# Patient Record
Sex: FEMALE | Race: ASIAN | NOT HISPANIC OR LATINO | Employment: FULL TIME | URBAN - METROPOLITAN AREA
[De-identification: names, ages, dates, MRNs, and addresses within clinical notes are randomized per-mention and may not be internally consistent; named-entity substitution may affect disease eponyms.]

---

## 2017-03-31 ENCOUNTER — APPOINTMENT (OUTPATIENT)
Dept: LAB | Facility: CLINIC | Age: 42
End: 2017-03-31
Payer: COMMERCIAL

## 2017-03-31 ENCOUNTER — TRANSCRIBE ORDERS (OUTPATIENT)
Dept: LAB | Facility: CLINIC | Age: 42
End: 2017-03-31

## 2017-03-31 DIAGNOSIS — M25.552 PAIN IN LEFT HIP: ICD-10-CM

## 2017-03-31 DIAGNOSIS — R79.89 OTHER SPECIFIED ABNORMAL FINDINGS OF BLOOD CHEMISTRY: ICD-10-CM

## 2017-03-31 DIAGNOSIS — R10.13 EPIGASTRIC PAIN: ICD-10-CM

## 2017-03-31 DIAGNOSIS — J30.9 ALLERGIC RHINITIS: ICD-10-CM

## 2017-03-31 DIAGNOSIS — R73.09 OTHER ABNORMAL GLUCOSE: ICD-10-CM

## 2017-03-31 DIAGNOSIS — L70.9 ACNE: ICD-10-CM

## 2017-03-31 DIAGNOSIS — Z00.00 ENCOUNTER FOR GENERAL ADULT MEDICAL EXAMINATION WITHOUT ABNORMAL FINDINGS: ICD-10-CM

## 2017-03-31 DIAGNOSIS — E10.9 TYPE 1 DIABETES MELLITUS WITHOUT COMPLICATIONS (HCC): ICD-10-CM

## 2017-03-31 DIAGNOSIS — N64.4 MASTODYNIA: ICD-10-CM

## 2017-03-31 LAB
ALBUMIN SERPL BCP-MCNC: 3.2 G/DL (ref 3.5–5)
ALP SERPL-CCNC: 111 U/L (ref 46–116)
ALT SERPL W P-5'-P-CCNC: 34 U/L (ref 12–78)
ANION GAP SERPL CALCULATED.3IONS-SCNC: 7 MMOL/L (ref 4–13)
AST SERPL W P-5'-P-CCNC: 17 U/L (ref 5–45)
BILIRUB SERPL-MCNC: 0.4 MG/DL (ref 0.2–1)
BUN SERPL-MCNC: 10 MG/DL (ref 5–25)
CALCIUM SERPL-MCNC: 8.6 MG/DL (ref 8.3–10.1)
CHLORIDE SERPL-SCNC: 106 MMOL/L (ref 100–108)
CHOLEST SERPL-MCNC: 195 MG/DL (ref 50–200)
CO2 SERPL-SCNC: 29 MMOL/L (ref 21–32)
CREAT SERPL-MCNC: 0.77 MG/DL (ref 0.6–1.3)
CREAT UR-MCNC: 221 MG/DL
ERYTHROCYTE [DISTWIDTH] IN BLOOD BY AUTOMATED COUNT: 13.1 % (ref 11.6–15.1)
EST. AVERAGE GLUCOSE BLD GHB EST-MCNC: 143 MG/DL
GFR SERPL CREATININE-BSD FRML MDRD: >60 ML/MIN/1.73SQ M
GLUCOSE P FAST SERPL-MCNC: 119 MG/DL (ref 65–99)
HBA1C MFR BLD: 6.6 % (ref 4.2–6.3)
HCT VFR BLD AUTO: 37.9 % (ref 34.8–46.1)
HDLC SERPL-MCNC: 54 MG/DL (ref 40–60)
HGB BLD-MCNC: 11.8 G/DL (ref 11.5–15.4)
LDLC SERPL CALC-MCNC: 117 MG/DL (ref 0–100)
MCH RBC QN AUTO: 26.5 PG (ref 26.8–34.3)
MCHC RBC AUTO-ENTMCNC: 31.1 G/DL (ref 31.4–37.4)
MCV RBC AUTO: 85 FL (ref 82–98)
MICROALBUMIN UR-MCNC: 11.4 MG/L (ref 0–20)
MICROALBUMIN/CREAT 24H UR: 5 MG/G CREATININE (ref 0–30)
PLATELET # BLD AUTO: 234 THOUSANDS/UL (ref 149–390)
PMV BLD AUTO: 10 FL (ref 8.9–12.7)
POTASSIUM SERPL-SCNC: 4 MMOL/L (ref 3.5–5.3)
PROT SERPL-MCNC: 6.8 G/DL (ref 6.4–8.2)
RBC # BLD AUTO: 4.45 MILLION/UL (ref 3.81–5.12)
SODIUM SERPL-SCNC: 142 MMOL/L (ref 136–145)
TRIGL SERPL-MCNC: 119 MG/DL
TSH SERPL DL<=0.05 MIU/L-ACNC: 3.05 UIU/ML (ref 0.36–3.74)
WBC # BLD AUTO: 8.6 THOUSAND/UL (ref 4.31–10.16)

## 2017-03-31 PROCEDURE — 82570 ASSAY OF URINE CREATININE: CPT

## 2017-03-31 PROCEDURE — 80061 LIPID PANEL: CPT

## 2017-03-31 PROCEDURE — 80053 COMPREHEN METABOLIC PANEL: CPT

## 2017-03-31 PROCEDURE — 84443 ASSAY THYROID STIM HORMONE: CPT

## 2017-03-31 PROCEDURE — 82043 UR ALBUMIN QUANTITATIVE: CPT

## 2017-03-31 PROCEDURE — 36415 COLL VENOUS BLD VENIPUNCTURE: CPT

## 2017-03-31 PROCEDURE — 83036 HEMOGLOBIN GLYCOSYLATED A1C: CPT

## 2017-03-31 PROCEDURE — 85027 COMPLETE CBC AUTOMATED: CPT

## 2017-04-05 ENCOUNTER — ALLSCRIPTS OFFICE VISIT (OUTPATIENT)
Dept: OTHER | Facility: OTHER | Age: 42
End: 2017-04-05

## 2017-07-17 DIAGNOSIS — E10.9 TYPE 1 DIABETES MELLITUS WITHOUT COMPLICATIONS (HCC): ICD-10-CM

## 2017-08-29 ENCOUNTER — APPOINTMENT (OUTPATIENT)
Dept: LAB | Facility: CLINIC | Age: 42
End: 2017-08-29
Payer: COMMERCIAL

## 2017-08-29 ENCOUNTER — GENERIC CONVERSION - ENCOUNTER (OUTPATIENT)
Dept: OTHER | Facility: OTHER | Age: 42
End: 2017-08-29

## 2017-08-29 ENCOUNTER — TRANSCRIBE ORDERS (OUTPATIENT)
Dept: LAB | Facility: CLINIC | Age: 42
End: 2017-08-29

## 2017-08-29 DIAGNOSIS — E10.9 TYPE 1 DIABETES MELLITUS WITHOUT COMPLICATIONS (HCC): ICD-10-CM

## 2017-08-29 LAB
ALBUMIN SERPL BCP-MCNC: 3.2 G/DL (ref 3.5–5)
ALP SERPL-CCNC: 103 U/L (ref 46–116)
ALT SERPL W P-5'-P-CCNC: 24 U/L (ref 12–78)
ANION GAP SERPL CALCULATED.3IONS-SCNC: 8 MMOL/L (ref 4–13)
AST SERPL W P-5'-P-CCNC: 12 U/L (ref 5–45)
BILIRUB SERPL-MCNC: 0.5 MG/DL (ref 0.2–1)
BUN SERPL-MCNC: 9 MG/DL (ref 5–25)
CALCIUM SERPL-MCNC: 8.7 MG/DL (ref 8.3–10.1)
CHLORIDE SERPL-SCNC: 105 MMOL/L (ref 100–108)
CHOLEST SERPL-MCNC: 174 MG/DL (ref 50–200)
CO2 SERPL-SCNC: 27 MMOL/L (ref 21–32)
CREAT SERPL-MCNC: 0.65 MG/DL (ref 0.6–1.3)
ERYTHROCYTE [DISTWIDTH] IN BLOOD BY AUTOMATED COUNT: 13.3 % (ref 11.6–15.1)
EST. AVERAGE GLUCOSE BLD GHB EST-MCNC: 143 MG/DL
GFR SERPL CREATININE-BSD FRML MDRD: 108 ML/MIN/1.73SQ M
GLUCOSE P FAST SERPL-MCNC: 120 MG/DL (ref 65–99)
HBA1C MFR BLD: 6.6 % (ref 4.2–6.3)
HCT VFR BLD AUTO: 38.7 % (ref 34.8–46.1)
HDLC SERPL-MCNC: 46 MG/DL (ref 40–60)
HGB BLD-MCNC: 12.3 G/DL (ref 11.5–15.4)
LDLC SERPL CALC-MCNC: 96 MG/DL (ref 0–100)
MCH RBC QN AUTO: 26.3 PG (ref 26.8–34.3)
MCHC RBC AUTO-ENTMCNC: 31.8 G/DL (ref 31.4–37.4)
MCV RBC AUTO: 83 FL (ref 82–98)
PLATELET # BLD AUTO: 270 THOUSANDS/UL (ref 149–390)
PMV BLD AUTO: 9.5 FL (ref 8.9–12.7)
POTASSIUM SERPL-SCNC: 4.2 MMOL/L (ref 3.5–5.3)
PROT SERPL-MCNC: 7.1 G/DL (ref 6.4–8.2)
RBC # BLD AUTO: 4.68 MILLION/UL (ref 3.81–5.12)
SODIUM SERPL-SCNC: 140 MMOL/L (ref 136–145)
TRIGL SERPL-MCNC: 160 MG/DL
TSH SERPL DL<=0.05 MIU/L-ACNC: 1.42 UIU/ML (ref 0.36–3.74)
WBC # BLD AUTO: 7.56 THOUSAND/UL (ref 4.31–10.16)

## 2017-08-29 PROCEDURE — 36415 COLL VENOUS BLD VENIPUNCTURE: CPT

## 2017-08-29 PROCEDURE — 80061 LIPID PANEL: CPT

## 2017-08-29 PROCEDURE — 80053 COMPREHEN METABOLIC PANEL: CPT

## 2017-08-29 PROCEDURE — 83036 HEMOGLOBIN GLYCOSYLATED A1C: CPT

## 2017-08-29 PROCEDURE — 84443 ASSAY THYROID STIM HORMONE: CPT

## 2017-08-29 PROCEDURE — 85027 COMPLETE CBC AUTOMATED: CPT

## 2017-08-30 ENCOUNTER — ALLSCRIPTS OFFICE VISIT (OUTPATIENT)
Dept: OTHER | Facility: OTHER | Age: 42
End: 2017-08-30

## 2017-09-07 ENCOUNTER — ALLSCRIPTS OFFICE VISIT (OUTPATIENT)
Dept: OTHER | Facility: OTHER | Age: 42
End: 2017-09-07

## 2017-09-07 DIAGNOSIS — M54.2 CERVICALGIA: ICD-10-CM

## 2017-09-07 DIAGNOSIS — M25.562 PAIN IN LEFT KNEE: ICD-10-CM

## 2017-10-03 ENCOUNTER — APPOINTMENT (OUTPATIENT)
Dept: PHYSICAL THERAPY | Facility: CLINIC | Age: 42
End: 2017-10-03
Payer: COMMERCIAL

## 2017-10-03 ENCOUNTER — GENERIC CONVERSION - ENCOUNTER (OUTPATIENT)
Dept: OTHER | Facility: OTHER | Age: 42
End: 2017-10-03

## 2017-10-03 DIAGNOSIS — M25.562 PAIN IN LEFT KNEE: ICD-10-CM

## 2017-10-03 DIAGNOSIS — M54.2 CERVICALGIA: ICD-10-CM

## 2017-10-03 PROCEDURE — G8984 CARRY CURRENT STATUS: HCPCS

## 2017-10-03 PROCEDURE — 97162 PT EVAL MOD COMPLEX 30 MIN: CPT

## 2017-10-03 PROCEDURE — 97110 THERAPEUTIC EXERCISES: CPT

## 2017-10-03 PROCEDURE — G8985 CARRY GOAL STATUS: HCPCS

## 2017-10-11 ENCOUNTER — APPOINTMENT (OUTPATIENT)
Dept: PHYSICAL THERAPY | Facility: CLINIC | Age: 42
End: 2017-10-11
Payer: COMMERCIAL

## 2017-10-11 PROCEDURE — 97140 MANUAL THERAPY 1/> REGIONS: CPT

## 2017-10-11 PROCEDURE — 97112 NEUROMUSCULAR REEDUCATION: CPT

## 2017-10-13 ENCOUNTER — APPOINTMENT (OUTPATIENT)
Dept: PHYSICAL THERAPY | Facility: CLINIC | Age: 42
End: 2017-10-13
Payer: COMMERCIAL

## 2017-10-13 PROCEDURE — 97110 THERAPEUTIC EXERCISES: CPT

## 2017-10-13 PROCEDURE — 97112 NEUROMUSCULAR REEDUCATION: CPT

## 2017-10-17 ENCOUNTER — APPOINTMENT (OUTPATIENT)
Dept: PHYSICAL THERAPY | Facility: CLINIC | Age: 42
End: 2017-10-17
Payer: COMMERCIAL

## 2017-10-17 PROCEDURE — 97112 NEUROMUSCULAR REEDUCATION: CPT

## 2017-10-17 PROCEDURE — 97110 THERAPEUTIC EXERCISES: CPT

## 2017-10-18 ENCOUNTER — GENERIC CONVERSION - ENCOUNTER (OUTPATIENT)
Dept: OTHER | Facility: OTHER | Age: 42
End: 2017-10-18

## 2017-10-18 LAB
LEFT EYE DIABETIC RETINOPATHY: NORMAL
RIGHT EYE DIABETIC RETINOPATHY: NORMAL

## 2017-10-20 ENCOUNTER — APPOINTMENT (OUTPATIENT)
Dept: PHYSICAL THERAPY | Facility: CLINIC | Age: 42
End: 2017-10-20
Payer: COMMERCIAL

## 2017-10-20 PROCEDURE — 97110 THERAPEUTIC EXERCISES: CPT

## 2017-10-20 PROCEDURE — 97010 HOT OR COLD PACKS THERAPY: CPT | Performed by: PHYSICAL THERAPIST

## 2017-10-25 ENCOUNTER — APPOINTMENT (OUTPATIENT)
Dept: PHYSICAL THERAPY | Facility: CLINIC | Age: 42
End: 2017-10-25
Payer: COMMERCIAL

## 2017-10-25 PROCEDURE — 97110 THERAPEUTIC EXERCISES: CPT

## 2017-10-25 NOTE — PROGRESS NOTES
Assessment  1  Muscle spasm of back (724 8) (M62 830)    Plan  Muscle spasm of back    · Cyclobenzaprine HCl - 5 MG Oral Tablet; TAKE 1 TABLET AT BEDTIME    Discussion/Summary    Pt treated with muscle relaxant which she was advised to take on a when necessary basis  She was also advised warm compresses and gentle range of motion  Patient advised to avoid carrying heavy weight on the right side  Follow-up as needed Advised  Possible side effects of new medications were reviewed with the patient/guardian today  The treatment plan was reviewed with the patient/guardian  The patient/guardian understands and agrees with the treatment plan      Chief Complaint  Right breast pain and blisters      History of Present Illness  HPI: Patient is here reporting symptoms of upper back pain mainly on her right side  According to patient, her symptoms have been going on for the past 3 months, gradually worsening over time  pain starts in her upper back and radiates to the front of her chest wall, sometimes to the nipple  Patient describes it as a sharp shooting kind of pain  Muscle which usually helps relieve her symptoms  has also been experiencing increased itching of the area  states that she has been traveling recently and has been carrying luggage and backpack on her right shoulder, which could be exacerbating her symptoms, since she has had issues with her right shoulder in the past    Back Pain (Brief): The patient is being seen for an initial evaluation of back pain  Symptoms:  back stiffness, but-- no lower extremity numbness,-- no lower extremity tingling-- and-- no lower extremity weakness--    The patient presents with complaints of gradual onset of intermittent episodes of moderate right upper back pain, described as aching  The symptoms resulted from a lifting motion  Episodes started 3 months ago  Symptoms are made worse by lifting  Symptoms are unchanged  The patient is currently experiencing symptoms   No associated symptoms are reported  Review of Systems    Constitutional: as noted in HPI  Cardiovascular: no complaints of slow or fast heart rate, no chest pain, no palpitations, no leg claudication or lower extremity edema  Respiratory: no complaints of shortness of breath, no wheezing, no dyspnea on exertion, no orthopnea or PND  Musculoskeletal: as noted in HPI  Active Problems  1  Abdominal pain (789 00) (R10 9)   2  Abdominal pain, epigastric (789 06) (R10 13)   3  Abnormal glucose (790 29) (R73 09)   4  Abnormal liver function test (790 6) (R79 89)   5  Acne (706 1) (L70 9)   6  Allergic rhinitis (477 9) (J30 9)   7  Annual physical exam (V70 0) (Z00 00)   8  Breast pain, left (611 71) (N64 4)   9  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)   10  Chronic left hip pain (719 45,338 29) (M25 552,G89 29)   11  Chronic lower back pain (724 2,338 29) (M54 5,G89 29)   12  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)   13  Diabetes mellitus out of control (250 02) (E11 65)   14  Disease of nail (703 9) (L60 9)   15  Elevated serum alkaline phosphatase level (790 5) (R74 8)   16  Encounter for routine gynecological examination (V72 31) (Z01 419)   17  Encounter for screening mammogram for breast cancer (V76 12) (Z12 31)   18  Encounter for screening mammogram for malignant neoplasm of breast (V76 12)    (Z12 31)   19  Folliculitis (245 4) (T96 7)   20  Gastritis (535 50) (K29 70)   21  GERD without esophagitis (530 81) (K21 9)   22  Hand pain, unspecified laterality   23  Hyperlipidemia (272 4) (E78 5)   24  Impaired fasting glucose (790 21) (R73 01)   25  Influenza vaccine needed (V04 81) (Z23)   26  Ingrown nail of great toe of right foot (703 0) (L60 0)   27  Intervertebral disc disorders with radiculopathy, lumbar region (724 4) (M51 16)   28  Knee effusion, left (719 06) (M25 462)   29  Left knee pain (719 46) (M25 562)   30  Left lumbar radiculopathy (724 4) (M54 16)   31   Lower abdominal pain (789 09) (R10 30)   32  Mastitis (611 0) (N61 0)   33  Neck muscle strain (847 0) (S16 1XXA)   34  Need for pneumococcal vaccination (V03 82) (Z23)   35  Need for prophylactic vaccination and inoculation against influenza (V04 81) (Z23)   36  Obesity (278 00) (E66 9)   37  Patellofemoral syndrome (719 46) (M22 2X9)   38  Screening for diabetic retinopathy (V80 2) (Z13 5)   39  Strain of quadriceps muscle, fascia, or tendon (843 8) (S76 119A)   40  Tachypnea (786 06) (R06 82)   41  Type 2 diabetes mellitus (250 00) (E11 9)   42  Vaginal candidiasis (112 1) (B37 3)   43  Visit for screening mammogram (V76 12) (Z12 31)   44  Wart viral (078 10) (B07 9)    Past Medical History  1  History of Acute bronchiolitis (466 19) (J21 9)   2  History of Acute upper respiratory infection (465 9) (J06 9)   3  History of Cough (786 2) (R05)   4  History of Dysuria (788 1) (R30 0)   5  History of Elevated serum alkaline phosphatase level (790 5) (R74 8)   6  History of Encounter for removal of sutures (V58 32) (Z48 02)   7  History of Endometriosis (617 9) (N80 9)   8  History of acute bronchitis (V12 69) (Z87 09)   9  History of acute sinusitis (V12 69) (Z87 09)   10  History of allergic rhinitis (V12 69) (Z87 09)   11  History of atopic dermatitis (V13 3) (Z87 2)   12  History of headache (V13 89) (Z87 898)   13  History of heartburn (V12 79) (Z87 898)   14  History of pregnancy (V13 29)   15  History of vertigo (V12 49) (Z87 898)   16  History of viral warts (V12 09) (Z86 19)   17  History of Influenza vaccine needed (V04 81) (Z23)   18  History of Menarche (V21 8)   19  History of Noninfectious Dermatological Conditions (709 9)   20  History of Plantar fascial fibromatosis (728 71) (M72 2)   21  History of Visit for screening mammogram (V76 12) (Z12 31)   22  History of Well adult on routine health check (V70 0) (Z00 00)  Active Problems And Past Medical History Reviewed:    The active problems and past medical history were reviewed and updated today  Family History  Mother    1  Family history of Type 2 Diabetes Mellitus  Father    2  Family history of Hyperlipidemia  Son    3  Family history of Overall Condition: Good    Social History   · Denied: History of Alcohol Use (History)   · Caffeine Use   · Marital History - Currently    · Never A Smoker  The social history was reviewed and updated today  Surgical History  1  History of Appendectomy   2  History of Total Abdominal Hysterectomy    Current Meds   1  Accu-Chek Active STRP; USE 1 STRIP Twice daily; Therapy: 15CIL6511 to (Last Rx:48Bha8081)  Requested for: 70CRT0698 Ordered   2  Atorvastatin Calcium 10 MG Oral Tablet; take 1 tablet by mouth every evening; Therapy: 01SZT9217 to (Evaluate:04Oct2017)  Requested for: 07Apr2017; Last   Rx:42Zhe8924 Ordered   3  Fluticasone Propionate 50 MCG/ACT Nasal Suspension; use 2 sprays in each nostril   once daily; Therapy: 73NSJ7588 to ([phone])  Requested for: 07Apr2017; Last   Rx:79Ejv1493 Ordered   4  Janumet  MG Oral Tablet; Take 1 tablet  daily; Therapy: 12WDO2068 to (Evaluate:04Oct2017)  Requested for: 07Apr2017; Last   Rx:11Cgy4551 Ordered   5  Montelukast Sodium 10 MG Oral Tablet; TAKE 1 TABLET BY MOUTH EVERY DAY IN   THE MORNING; Therapy: 85OSE3473 to ([phone])  Requested for: 07Apr2017; Last   Rx:11Agc3550 Ordered   6  Omeprazole 20 MG Oral Capsule Delayed Release; TAKE 1 CAPSULE DAILY; Therapy: 47Mbp2199 to (Evaluate:04Oct2017)  Requested for: 07Apr2017; Last   Rx:07Apr2017 Ordered   7  OneTouch Lancets Miscellaneous; check blood sugar 1-2 times daily , Dx 250; Therapy: 62EAM6448 to (Last Rx:85Qky7173)  Requested for: 74KBL4532 Ordered    The medication list was reviewed and updated today  Allergies  1  Tylenol TABS   2   Tylenol with Codeine #3 TABS    Vitals   Recorded: 36Osx6141 10:58AM Recorded: 17Ivr3053 10:54AM   Temperature 98 5 F    Heart Rate  72   Respiration  14   Systolic 240   Diastolic  72   Height  6 ft 7 in   Weight  190 lb    BMI Calculated  21 4   BSA Calculated  2 23     Physical Exam    Constitutional   General appearance: No acute distress, well appearing and well nourished  Pulmonary   Auscultation of lungs: Clear to auscultation  Cardiovascular   Auscultation of heart: Normal rate and rhythm, normal S1 and S2, without murmurs  Examination of extremities for edema and/or varicosities: Normal     Musculoskeletal   Inspection/palpation of joints, bones, and muscles: Normal     Skin   Skin and subcutaneous tissue: Normal without rashes or lesions  Neurologic   Cranial nerves: Cranial nerves 2-12 intact  Reflexes: 2+ and symmetric  Psychiatric   Orientation to person, place, and time: Normal     Mood and affect: Normal     Additional Exam:  Breast- no abnormal swelling felt  Future Appointments    Date/Time Provider Specialty Site   09/07/2017 02:00 PM HIRA Lucero   Family [facility]     Signatures   Electronically signed by : Carolina Adams MD; Aug 30 2017 12:36PM EST                       (Author)

## 2017-10-26 NOTE — PROGRESS NOTES
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)   2  Left knee pain (719 46) (M25 562)   3  Chronic neck pain (723 1,338 29) (M54 2,G89 29)   4  Need for influenza vaccination (V04 81) (Z23)   5  Shingles rash (053 9) (B02 9)   · on rt side breast and chest,, healing    Plan  Brittle diabetes mellitus    · (1) CBC/ PLT (NO DIFF); Status:Active; Requested for:07Dec2017;    · (1) COMPREHENSIVE METABOLIC PANEL; Status:Active; Requested for:07Dec2017;    · (1) HEMOGLOBIN A1C; Status:Active; Requested for:07Dec2017;    · (1) LIPID PANEL, FASTING; Status:Active; Requested for:07Dec2017;    · Follow-up visit in 3 months Evaluation and Treatment  Follow-up  Status: Hold For -  Scheduling  Requested for: 07Sep2017   · Begin a limited exercise program ; Status:Complete;   Done: 02AHR8289   · Inspect your feet and legs daily if you have vascular disease ; Status:Complete;   Done:  83LPK7504   · Restrict the salt in your diet by avoiding highly salted foods ; Status:Complete;   Done:  80PCS4188   · Some eating tips that can help you lose weight ; Status:Complete;   Done: 77HEW0646   · Start eating more fiber ; Status:Complete;   Done: 25CAQ6594   · There are many exercise options for seniors ; Status:Complete;   Done: 25GUR0982  Chronic neck pain    · *VB - Foot Exam; Status:Active; Requested OGA:12DGW7161;   Chronic neck pain, Left knee pain    · *1 - [facility]  *  Status: Active  Requested for: 07Sep2017  Care Summary provided  : Yes  GERD without esophagitis    · Omeprazole 20 MG Oral Capsule Delayed Release; TAKE 1 CAPSULE DAILY  Hyperlipidemia    · Atorvastatin Calcium 10 MG Oral Tablet; take 1 tablet by mouth every evening  Need for influenza vaccination    · Fluzone Quadrivalent 0 5 ML Intramuscular Suspension Prefilled Syringe;  INJECT 0 5  ML Intramuscular;  To Be Done: 07Sep2017  PMH: History of allergic rhinitis, Type 2 diabetes mellitus,     · Montelukast Sodium 10 MG Oral Tablet (Singulair); TAKE 1 TABLET BY MOUTH  EVERY DAY IN THE MORNING  Type 2 diabetes mellitus    · Janumet  MG Oral Tablet; Take 1 tablet  daily    Discussion/Summary    Diabetes is well control A1c is 6 6 which is stable, continue same medication  low carb diet  stable LDL improved, its 96 now,has a shingles rash on the right side of the chest wall and it is already dried up, is itchy so she can use hydrocortisone cream or Benadryl cream,will go mammogram after this rash completely resolves,has chronic on and off neck pain and bilateral, she does lot of traveling and lifting weights and also she has left knee pain which only gets worse when she holds this leg over the other leg, she has previous history of this knee effusion and a steroid shot was given by orthopedic,  Advised to get physical therapy for the left knee and the neck,  And if symptoms do not improve needs to see a orthopedic, she has previous MRI of her back with some disc bulging, and there is no back pain now,  The patient was counseled regarding diagnostic results,-- instructions for management,-- prognosis,-- impressions,-- risks and benefits of treatment options,-- importance of compliance with treatment  Possible side effects of new medications were reviewed with the patient/guardian today  The treatment plan was reviewed with the patient/guardian  The patient/guardian understands and agrees with the treatment plan      Chief Complaint  FOLLOW UP TO REVIEW LABS      History of Present Illness  NECK PAIN FOR 1 YEAR on and off she feels discomfort she denies any injury to the neck, she does lot of travel to [facility] and lifts heavy backpacks, us or shooting pain to the arms  KNEE PAIN FOR 1 YEAR, HAD CORtisone injection in this knee last year and was seen by orthopedic she also had some effusion at that time in the knee she has no problem in walking but only feels pain when she put this leg on the other leg ,complains of a rash and itching on the right side of her chest wall, which started a few weeks ago that is no vesicles but sometimes she touches the area, she feels discomfort,  she had chickenpox as a babyhas been going through a lot of stress traveling a lot,is diabetic and she has been following her diet with low carb and she has hyperlipidemia and she takes her both medications regularly  Review of Systems    ROS reviewed  Active Problems  1  Abdominal pain (789 00) (R10 9)   2  Abdominal pain, epigastric (789 06) (R10 13)   3  Abnormal glucose (790 29) (R73 09)   4  Abnormal liver function test (790 6) (R79 89)   5  Acne (706 1) (L70 9)   6  Allergic rhinitis (477 9) (J30 9)   7  Annual physical exam (V70 0) (Z00 00)   8  Breast pain, left (611 71) (N64 4)   9  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)   10  Chronic left hip pain (719 45,338 29) (M25 552,G89 29)   11  Chronic lower back pain (724 2,338 29) (M54 5,G89 29)   12  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)   13  Disease of nail (703 9) (L60 9)   14  Elevated serum alkaline phosphatase level (790 5) (R74 8)   15  Encounter for routine gynecological examination (V72 31) (Z01 419)   16  Encounter for screening mammogram for breast cancer (V76 12) (Z12 31)   17  Encounter for screening mammogram for malignant neoplasm of breast (V76 12)    (Z12 31)   18  Folliculitis (659 0) (Q20 8)   19  Gastritis (535 50) (K29 70)   20  GERD without esophagitis (530 81) (K21 9)   21  Hand pain, unspecified laterality   22  Hyperlipidemia (272 4) (E78 5)   23  Impaired fasting glucose (790 21) (R73 01)   24  Influenza vaccine needed (V04 81) (Z23)   25  Ingrown nail of great toe of right foot (703 0) (L60 0)   26  Intervertebral disc disorders with radiculopathy, lumbar region (724 4) (M51 16)   27  Knee effusion, left (719 06) (M25 462)   28  Left knee pain (719 46) (M25 562)   29  Left lumbar radiculopathy (724 4) (M54 16)   30  Lower abdominal pain (789 09) (R10 30)   31   Mastitis (611 0) (N61 0)   32  Muscle spasm of back (724 8) (M62 830)   33  Neck muscle strain (847 0) (S16 1XXA)   34  Need for pneumococcal vaccination (V03 82) (Z23)   35  Need for prophylactic vaccination and inoculation against influenza (V04 81) (Z23)   36  Obesity (278 00) (E66 9)   37  Patellofemoral syndrome (719 46) (M22 2X9)   38  Screening for diabetic retinopathy (V80 2) (Z13 5)   39  Strain of quadriceps muscle, fascia, or tendon (843 8) (S76 119A)   40  Tachypnea (786 06) (R06 82)   41  Type 2 diabetes mellitus (250 00) (E11 9)   42  Vaginal candidiasis (112 1) (B37 3)   43  Visit for screening mammogram (V76 12) (Z12 31)   44  Wart viral (078 10) (B07 9)    Past Medical History  1  History of Acute bronchiolitis (466 19) (J21 9)   2  History of Acute upper respiratory infection (465 9) (J06 9)   3  History of Cough (786 2) (R05)   4  History of Dysuria (788 1) (R30 0)   5  History of Elevated serum alkaline phosphatase level (790 5) (R74 8)   6  History of Encounter for removal of sutures (V58 32) (Z48 02)   7  History of Endometriosis (617 9) (N80 9)   8  History of acute bronchitis (V12 69) (Z87 09)   9  History of acute sinusitis (V12 69) (Z87 09)   10  History of allergic rhinitis (V12 69) (Z87 09)   11  History of atopic dermatitis (V13 3) (Z87 2)   12  History of headache (V13 89) (Z87 898)   13  History of heartburn (V12 79) (Z87 898)   14  History of pregnancy (V13 29)   15  History of vertigo (V12 49) (Z87 898)   16  History of viral warts (V12 09) (Z86 19)   17  History of Influenza vaccine needed (V04 81) (Z23)   18  History of Menarche (V21 8)   19  History of Noninfectious Dermatological Conditions (709 9)   20  History of Plantar fascial fibromatosis (728 71) (M72 2)   21  History of Visit for screening mammogram (V76 12) (Z12 31)   22  History of Well adult on routine health check (V70 0) (Z00 00)    The active problems and past medical history were reviewed and updated today  Surgical History  1  History of Appendectomy   2  History of Total Abdominal Hysterectomy    The surgical history was reviewed and updated today  Family History  Mother    1  Family history of Type 2 Diabetes Mellitus  Father    2  Family history of Hyperlipidemia  Son    3  Family history of Overall Condition: Good    The family history was reviewed and updated today  Social History   · Denied: History of Alcohol Use (History)   · Caffeine Use   · Marital History - Currently    · Never A Smoker   · One child  The social history was reviewed and updated today  Current Meds   1  Accu-Chek Active STRP; USE 1 STRIP Twice daily; Therapy: 58MJH6802 to (Last Rx:17Jbs1317)  Requested for: 18ERO1182 Ordered   2  Atorvastatin Calcium 10 MG Oral Tablet; take 1 tablet by mouth every evening; Therapy: 23FWD1889 to (Evaluate:04Oct2017)  Requested for: 07Apr2017; Last   Rx:07Apr2017 Ordered   3  Cyclobenzaprine HCl - 5 MG Oral Tablet; TAKE 1 TABLET AT BEDTIME; Therapy: 74Hqw3996 to (Evaluate:51Egr0362)  Requested for: 66Iim4108; Last   Rx:07Mrd4134 Ordered   4  Fluticasone Propionate 50 MCG/ACT Nasal Suspension; use 2 sprays in each nostril   once daily; Therapy: 14OQN5447 to (21 234 )  Requested for: 07Apr2017; Last   Rx:07Apr2017 Ordered   5  Janumet  MG Oral Tablet; Take 1 tablet  daily; Therapy: 13BJZ8431 to (Evaluate:04Oct2017)  Requested for: 07Apr2017; Last   Rx:07Apr2017 Ordered   6  Montelukast Sodium 10 MG Oral Tablet; TAKE 1 TABLET BY MOUTH EVERY DAY IN THE   MORNING; Therapy: 69IBA0455 to (21 234 )  Requested for: 07Apr2017; Last   Rx:07Apr2017 Ordered   7  Omeprazole 20 MG Oral Capsule Delayed Release; TAKE 1 CAPSULE DAILY; Therapy: 21Jan2016 to (Evaluate:04Oct2017)  Requested for: 07Apr2017; Last   Rx:07Apr2017 Ordered   8  OneTouch Lancets Miscellaneous; check blood sugar 1-2 times daily , Dx 250;    Therapy: 44YRZ7265 to (Last NT:50PXH7663)  Requested for: 44IOO7401 Ordered    The medication list was reviewed and updated today  Allergies  1  Tylenol TABS   2  Tylenol with Codeine #3 TABS    Vitals  Vital Signs    Recorded: 07Sep2017 01:52PM   Heart Rate 82   Respiration 16   Systolic 601   Diastolic 72   Height 5 ft 7 in   Weight 192 lb    BMI Calculated 30 07   BSA Calculated 1 99     Physical Exam    Constitutional   General appearance: No acute distress, well appearing and well nourished  Eyes   Conjunctiva and lids: No swelling, erythema or discharge  Pupils and irises: Equal, round and reactive to light  Ears, Nose, Mouth, and Throat   External inspection of ears and nose: Normal     Oropharynx: Normal with no erythema, edema, exudate or lesions  Pulmonary   Respiratory effort: No increased work of breathing or signs of respiratory distress  Auscultation of lungs: Clear to auscultation  Cardiovascular   Palpation of heart: Normal PMI, no thrills  Auscultation of heart: Normal rate and rhythm, normal S1 and S2, without murmurs  Examination of extremities for edema and/or varicosities: Normal     Abdomen   Abdomen: Non-tender, no masses  Liver and spleen: No hepatomegaly or splenomegaly  Lymphatic   Palpation of lymph nodes in neck: No lymphadenopathy  Musculoskeletal   Gait and station: Normal     Digits and nails: Normal without clubbing or cyanosis  Inspection/palpation of joints, bones, and muscles: Abnormal  -- Mildly tender in the bilateral muscles in the neck  Skin   Skin and subcutaneous tissue: Abnormal  -- Dried rash on the right side of chest wall starting from the front going to the back in a band,  Neurologic   Cranial nerves: Cranial nerves 2-12 intact  Psychiatric   Orientation to person, place, and time: Normal         Socks and shoes removed, Right Foot Findings: normal foot, no swelling, no erythema  The right toes were normal-- and-- had full range of motion     The sensory exam showed normal vibratory sensation at the level of the toes on the right  Normal tactile sensation with monofilament testing throughout the right foot  Socks and shoes removed, Left Foot Findings: normal foot, no swelling, no erythema  The left toes were normal-- and-- had full range of motion  The sensory exam showed normal vibratory sensation at the level of the toes on the left  Normal tactile sensation with monofilament testing throughout the left foot  Pulses:   2+ in the dorsalis pedis on the right  Pulses:   2+ in the dorsalis pedis on the left  Results/Data  PHQ-2 Adult Depression Screening 31Inf0314 01:54PM User, Onel     Test Name Result Flag Reference   PHQ-2 Adult Depression Score 0     Over the last two weeks, how often have you been bothered by any of the following problems?   Little interest or pleasure in doing things: Not at all - 0  Feeling down, depressed, or hopeless: Not at all - 0   PHQ-2 Adult Depression Screening Negative       (1) CBC/ PLT (NO DIFF) 53Ysf6256 08:45AM Sofy Tavera Order Number: [id number]     Test Name Result Flag Reference   HEMATOCRIT 38 7 %  34 8-46 1   HEMOGLOBIN 12 3 g/dL  11 5-15 4   MCHC 31 8 g/dL  31 4-37 4   MCH 26 3 pg L 26 8-34 3   MCV 83 fL  82-98   PLATELET COUNT 094 Thousands/uL  149-390   RBC COUNT 4 68 Million/uL  3 81-5 12   RDW 13 3 %  11 6-15 1   WBC COUNT 7 56 Thousand/uL  4 31-10 16   MPV 9 5 fL  8 9-12 7     (1) COMPREHENSIVE METABOLIC PANEL 79GLZ1470 64:03HJ Sofy Tavera Order Number: [id number]     Test Name Result Flag Reference   SODIUM 140 mmol/L  136-145   POTASSIUM 4 2 mmol/L  3 5-5 3   CHLORIDE 105 mmol/L  100-108   CARBON DIOXIDE 27 mmol/L  21-32   ANION GAP (CALC) 8 mmol/L  4-13   BLOOD UREA NITROGEN 9 mg/dL  5-25   CREATININE 0 65 mg/dL  0 60-1 30   Standardized to IDMS reference method   CALCIUM 8 7 mg/dL  8 3-10 1   BILI, TOTAL 0 50 mg/dL  0 20-1 00   ALK PHOSPHATAS 103 U/L     ALT (SGPT) 24 U/L  12-78   Specimen collection should occur prior to Sulfasalazine administration due to the potential for falsely depressed results  AST(SGOT) 12 U/L  5-45   Specimen collection should occur prior to Sulfasalazine administration due to the potential for falsely depressed results  ALBUMIN 3 2 g/dL L 3 5-5 0   TOTAL PROTEIN 7 1 g/dL  6 4-8 2   eGFR 108 ml/min/1 73sq m     [facility] Disease Education Program recommendations are as follows:  GFR calculation is accurate only with a steady state creatinine  Chronic Kidney disease less than 60 ml/min/1 73 sq  meters  Kidney failure less than 15 ml/min/1 73 sq  meters  GLUCOSE FASTING 120 mg/dL H 65-99   Specimen collection should occur prior to Sulfasalazine administration due to the potential for falsely depressed results  Specimen collection should occur prior to Sulfapyridine administration due to the potential for falsely elevated results  (1) HEMOGLOBIN A1C 29Aug2017 08:45AM Teodora Craft Order Number: [id number]     Test Name Result Flag Reference   HEMOGLOBIN A1C 6 6 % H 4 2-6 3   EST  AVG  GLUCOSE 143 mg/dl       (1) LIPID PANEL, FASTING 29Aug2017 08:45AM Teodora Venancio Order Number: [id number]     Test Name Result Flag Reference   CHOLESTEROL 174 mg/dL     HDL,DIRECT 46 mg/dL  40-60   Specimen collection should occur prior to Metamizole administration due to the potential for falsley depressed results  LDL CHOLESTEROL CALCULATED 96 mg/dL  0-100   - Patient Instructions: This is a fasting blood test  Water,black tea or black  coffee only after 9:00pm the night before test   Drink 2 glasses of water the morning of test       Triglyceride:        Normal ??? ??? ??? ??? ??? ??? ??? <150 mg/dl   ??? ??? ???Borderline High ??? ??? 150-199 mg/dl   ??? ??? ? ?? High ??? ??? ??? ??? ??? ??? ??? 200-499 mg/dl   ??? ??? ? ??Very High ??? ??? ??? ??? ??? >499 mg/dl      Cholesterol:       Desirable ??? ??? ??? ??? <200 mg/dl   ??? ??? Borderline High ??? 200-239 mg/dl   ??? ??? High ??? ??? ??? ??? ??? ??? >239 mg/dl      HDL Cholesterol:       High ??? ???>59 mg/dL   ??? ??? Low ??? ??? <41 mg/dL      This screening LDL is a calculated result  It does not have the accuracy of the Direct Measured LDL in the monitoring of patients with hyperlipidemia and/or statin therapy  Direct Measure LDL (WWH905) must be ordered separately in these patients  TRIGLYCERIDES 160 mg/dL H <=150   Specimen collection should occur prior to N-Acetylcysteine or Metamizole administration due to the potential for falsely depressed results  (1) TSH 44Bvv7411 08:45AM Aurora Lamont Order Number: [id number]     Test Name Result Flag Reference   TSH 1 415 uIU/mL  0 358-3 740   - Patient Instructions: This bloodwork is non-fasting  Please drink two glasses of water morning of bloodwork  Patients undergoing fluorescein dye angiography may retain small amounts of fluorescein in the body for 48-72 hours post procedure  Samples containing fluorescein can produce falsely depressed TSH values  If the patient had this procedure,a specimen should be resubmitted post fluorescein clearance            The recommended reference ranges for TSH during pregnancy are as follows:  First trimester 0 1 to 2 5 uIU/mL  Second trimester  0 2 to 3 0 uIU/mL  Third trimester 0 3 to 3 0 uIU/m     Signatures   Electronically signed by : HIRA Shepherd ; Sep  7 2017  2:36PM EST                       (Author)

## 2017-10-27 ENCOUNTER — GENERIC CONVERSION - ENCOUNTER (OUTPATIENT)
Dept: OTHER | Facility: OTHER | Age: 42
End: 2017-10-27

## 2017-10-27 ENCOUNTER — APPOINTMENT (OUTPATIENT)
Dept: PHYSICAL THERAPY | Facility: CLINIC | Age: 42
End: 2017-10-27
Payer: COMMERCIAL

## 2017-10-27 PROCEDURE — 97110 THERAPEUTIC EXERCISES: CPT

## 2017-10-27 PROCEDURE — 97112 NEUROMUSCULAR REEDUCATION: CPT

## 2017-11-01 ENCOUNTER — APPOINTMENT (OUTPATIENT)
Dept: PHYSICAL THERAPY | Facility: CLINIC | Age: 42
End: 2017-11-01
Payer: COMMERCIAL

## 2017-11-01 PROCEDURE — 97110 THERAPEUTIC EXERCISES: CPT

## 2017-11-01 PROCEDURE — 97112 NEUROMUSCULAR REEDUCATION: CPT

## 2017-11-01 PROCEDURE — 97140 MANUAL THERAPY 1/> REGIONS: CPT

## 2017-11-03 ENCOUNTER — APPOINTMENT (OUTPATIENT)
Dept: PHYSICAL THERAPY | Facility: CLINIC | Age: 42
End: 2017-11-03
Payer: COMMERCIAL

## 2017-11-03 PROCEDURE — 97110 THERAPEUTIC EXERCISES: CPT

## 2017-11-03 PROCEDURE — 97140 MANUAL THERAPY 1/> REGIONS: CPT

## 2017-11-08 ENCOUNTER — APPOINTMENT (OUTPATIENT)
Dept: PHYSICAL THERAPY | Facility: CLINIC | Age: 42
End: 2017-11-08
Payer: COMMERCIAL

## 2017-11-10 ENCOUNTER — APPOINTMENT (OUTPATIENT)
Dept: PHYSICAL THERAPY | Facility: CLINIC | Age: 42
End: 2017-11-10
Payer: COMMERCIAL

## 2017-11-10 PROCEDURE — 97110 THERAPEUTIC EXERCISES: CPT

## 2017-11-10 PROCEDURE — 97112 NEUROMUSCULAR REEDUCATION: CPT

## 2017-11-10 PROCEDURE — 97140 MANUAL THERAPY 1/> REGIONS: CPT

## 2017-11-13 ENCOUNTER — APPOINTMENT (OUTPATIENT)
Dept: PHYSICAL THERAPY | Facility: CLINIC | Age: 42
End: 2017-11-13
Payer: COMMERCIAL

## 2017-11-13 PROCEDURE — 97110 THERAPEUTIC EXERCISES: CPT

## 2017-11-13 PROCEDURE — 97140 MANUAL THERAPY 1/> REGIONS: CPT

## 2017-11-16 ENCOUNTER — APPOINTMENT (OUTPATIENT)
Dept: PHYSICAL THERAPY | Facility: CLINIC | Age: 42
End: 2017-11-16
Payer: COMMERCIAL

## 2017-11-16 ENCOUNTER — GENERIC CONVERSION - ENCOUNTER (OUTPATIENT)
Dept: OTHER | Facility: OTHER | Age: 42
End: 2017-11-16

## 2017-11-16 PROCEDURE — G8985 CARRY GOAL STATUS: HCPCS

## 2017-11-16 PROCEDURE — 97112 NEUROMUSCULAR REEDUCATION: CPT

## 2017-11-16 PROCEDURE — G8984 CARRY CURRENT STATUS: HCPCS

## 2017-11-16 PROCEDURE — 97140 MANUAL THERAPY 1/> REGIONS: CPT

## 2017-11-16 PROCEDURE — 97110 THERAPEUTIC EXERCISES: CPT

## 2017-11-20 ENCOUNTER — ALLSCRIPTS OFFICE VISIT (OUTPATIENT)
Dept: OTHER | Facility: OTHER | Age: 42
End: 2017-11-20

## 2017-11-20 DIAGNOSIS — M54.9 DORSALGIA: ICD-10-CM

## 2017-11-20 DIAGNOSIS — M54.12 RADICULOPATHY OF CERVICAL REGION: ICD-10-CM

## 2017-11-21 ENCOUNTER — TRANSCRIBE ORDERS (OUTPATIENT)
Dept: ADMINISTRATIVE | Facility: HOSPITAL | Age: 42
End: 2017-11-21

## 2017-11-21 DIAGNOSIS — M54.2 NECK PAIN: Primary | ICD-10-CM

## 2017-11-21 NOTE — PROGRESS NOTES
Assessment    1  Cervical radiculitis (723 4) (M54 12)   2  Upper back pain (724 5) (M54 9)    Plan  Cervical radiculitis    · Cyclobenzaprine HCl - 5 MG Oral Tablet; TAKE 1 TABLET Bedtime   · * MRI CERVICAL SPINE WO CONTRAST; Status:Need Information - FinancialAuthorization; Requested for:20Nov2017;    · * XR SPINE CERVICAL COMPLETE 4 OR 5 VW NON INJURY; Status:Active; Requestedfor:20Nov2017;    · Follow-up visit in 2 weeks Evaluation and Treatment  Follow-up  Status: Hold For -Scheduling  Requested for: [id number]  Upper back pain    · * XR SPINE THORACIC 3 VIEW; Status:Active; Requested for:20Nov2017;     Discussion/Summary    She has chronic neck and upper back pain with worsening of her symptoms on and off with tingling numbness and radicular pain in the low right arm,to get the x-ray of the cervical and thoracic spine and MRI of the cervical spine could be radiculopathy pain due to nerve compression,try muscle relaxant for 1 week and will follow up in 2 weeks, advised to hold on on physical therapy till she has follow-up  The patient was counseled regarding instructions for management,-- impressions,-- importance of compliance with treatment  Possible side effects of new medications were reviewed with the patient/guardian today  The treatment plan was reviewed with the patient/guardian   The patient/guardian understands and agrees with the treatment plan      Chief Complaint  neck pain      History of Present Illness  HPI: She complains of neck and upper back pain which is going on for more than a year and she is doing physical therapy and even with physical therapy she says some days her pain gets worse, pain is mostly mild and the physical therapy and message helps to improve her pain but it never goes away and other days the pain goes worse it is moderate and radiates to the right arm with tingling numbness and her arm feels heavy and even stiffness in the morning and she cannot make a fist, she says she takes Tylenol or ibuprofen as needed,the past she has been evaluated for her lower back and had MRI of the lower back and she had some degenerative disc disease at that area but the pain is resolved in that area,says during physical therapy she was told that you should go to the doctor and talked to them about your pain      Review of Systems   Constitutional: No fever, no chills, feels well, no tiredness, no recent weight gain or loss  ENT: no ear ache, no loss of hearing, no nosebleeds or nasal discharge, no sore throat or hoarseness  Cardiovascular: no complaints of slow or fast heart rate, no chest pain, no palpitations, no leg claudication or lower extremity edema  Respiratory: no complaints of shortness of breath, no wheezing, no dyspnea on exertion, no orthopnea or PND  Gastrointestinal: no complaints of abdominal pain, no constipation, no nausea or diarrhea, no vomiting, no bloody stools  Genitourinary: no complaints of dysuria, no incontinence, no pelvic pain, no dysmenorrhea, no vaginal discharge or abnormal vaginal bleeding  Musculoskeletal: as noted in HPI  Integumentary: no complaints of skin rash or lesion, no itching or dry skin, no skin wounds  Neurological: no complaints of headache, no confusion, no numbness or tingling, no dizziness or fainting  ROS reviewed  Active Problems    1  Abdominal pain (789 00) (R10 9)   2  Abdominal pain, epigastric (789 06) (R10 13)   3  Abnormal glucose (790 29) (R73 09)   4  Abnormal liver function test (790 6) (R79 89)   5  Acne (706 1) (L70 9)   6  Allergic rhinitis (477 9) (J30 9)   7  Annual physical exam (V70 0) (Z00 00)   8  Breast pain, left (611 71) (N64 4)   9  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)   10  Chronic left hip pain (719 45,338 29) (M25 552,G89 29)   11  Chronic lower back pain (724 2,338 29) (M54 5,G89 29)   12  Chronic neck pain (723 1,338 29) (M54 2,G89 29)   13  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)   14   Disease of nail (703 9) (L60 9)   15  Elevated serum alkaline phosphatase level (790 5) (R74 8)   16  Encounter for routine gynecological examination (V72 31) (Z01 419)   17  Encounter for screening mammogram for breast cancer (V76 12) (Z12 31)   18  Encounter for screening mammogram for malignant neoplasm of breast (V76 12)  (Z12 31)   19  Folliculitis (037 0) (I62 5)   20  Gastritis (535 50) (K29 70)   21  GERD without esophagitis (530 81) (K21 9)   22  Hand pain, unspecified laterality   23  Hyperlipidemia (272 4) (E78 5)   24  Impaired fasting glucose (790 21) (R73 01)   25  Influenza vaccine needed (V04 81) (Z23)   26  Ingrown nail of great toe of right foot (703 0) (L60 0)   27  Intervertebral disc disorders with radiculopathy, lumbar region (724 4) (M51 16)   28  Knee effusion, left (719 06) (M25 462)   29  Left knee pain (719 46) (M25 562)   30  Left lumbar radiculopathy (724 4) (M54 16)   31  Lower abdominal pain (789 09) (R10 30)   32  Mastitis (611 0) (N61 0)   33  Muscle spasm of back (724 8) (M62 830)   34  Neck muscle strain (847 0) (S16 1XXA)   35  Need for influenza vaccination (V04 81) (Z23)   36  Need for pneumococcal vaccination (V03 82) (Z23)   37  Need for prophylactic vaccination and inoculation against influenza (V04 81) (Z23)   38  Obesity (278 00) (E66 9)   39  Patellofemoral syndrome (719 46) (M22 2X9)   40  Screening for diabetic retinopathy (V80 2) (Z13 5)   41  Shingles rash (053 9) (B02 9)   42  Strain of quadriceps muscle, fascia, or tendon (843 8) (S76 119A)   43  Tachypnea (786 06) (R06 82)   44  Type 2 diabetes mellitus (250 00) (E11 9)   45  Vaginal candidiasis (112 1) (B37 3)   46  Visit for screening mammogram (V76 12) (Z12 31)   47  Wart viral (078 10) (B07 9)    Past Medical History  1  History of Acute bronchiolitis (466 19) (J21 9)   2  History of Acute upper respiratory infection (465 9) (J06 9)   3  History of Cough (786 2) (R05)   4  History of Dysuria (788 1) (R30 0)   5   History of Elevated serum alkaline phosphatase level (790 5) (R74 8)   6  History of Encounter for removal of sutures (V58 32) (Z48 02)   7  History of Endometriosis (617 9) (N80 9)   8  History of acute bronchitis (V12 69) (Z87 09)   9  History of acute sinusitis (V12 69) (Z87 09)   10  History of allergic rhinitis (V12 69) (Z87 09)   11  History of atopic dermatitis (V13 3) (Z87 2)   12  History of headache (V13 89) (Z87 898)   13  History of heartburn (V12 79) (Z87 898)   14  History of pregnancy (V13 29)   15  History of vertigo (V12 49) (Z87 898)   16  History of viral warts (V12 09) (Z86 19)   17  History of Influenza vaccine needed (V04 81) (Z23)   18  History of Menarche (V21 8)   19  History of Noninfectious Dermatological Conditions (709 9)   20  History of Plantar fascial fibromatosis (728 71) (M72 2)   21  History of Visit for screening mammogram (V76 12) (Z12 31)   22  History of Well adult on routine health check (V70 0) (Z00 00)  Active Problems And Past Medical History Reviewed: The active problems and past medical history were reviewed and updated today  Family History  Mother    1  Family history of Type 2 Diabetes Mellitus  Father    2  Family history of Hyperlipidemia  Son    3  Family history of Overall Condition: Good  Family History Reviewed: The family history was reviewed and updated today  Social History   · Denied: History of Alcohol Use (History)   · Caffeine Use   · Marital History - Currently    · Never A Smoker   · One child  The social history was reviewed and updated today  Surgical History    1  History of Appendectomy   2  History of Total Abdominal Hysterectomy  Surgical History Reviewed: The surgical history was reviewed and updated today  Current Meds   1  Accu-Chek Active STRP; USE 1 STRIP Twice daily; Therapy: 79GDQ1412 to (Last Rx:55Wsg9661)  Requested for: 26SGG1317 Ordered   2   Atorvastatin Calcium 10 MG Oral Tablet; take 1 tablet by mouth every evening; Therapy: 25STZ7025 to (Scotty Jacobshenrik)  Requested for: 07Sep2017; Last Rx:07Sep2017 Ordered   3  Fluticasone Propionate 50 MCG/ACT Nasal Suspension; use 2 sprays in each nostril once daily; Therapy: 50MEY2992 to (23 [id number])  Requested for: 07Apr2017; Last Rx:07Apr2017 Ordered   4  Janumet  MG Oral Tablet; Take 1 tablet  daily; Therapy: 17XZC8622 to (Scotty Lacy)  Requested for: 07Sep2017; Last Rx:07Sep2017 Ordered   5  Montelukast Sodium 10 MG Oral Tablet; TAKE 1 TABLET BY MOUTH EVERY DAY IN THE MORNING; Therapy: 97MQV0186 to (Scotty Jacobskin)  Requested for: 07Sep2017; Last Rx:07Sep2017 Ordered   6  Omeprazole 20 MG Oral Capsule Delayed Release; TAKE 1 CAPSULE DAILY; Therapy: 21Jan2016 to (Scotty Jacobshenrik)  Requested for: 07Sep2017; Last ET:92BNY3010 Ordered   7  OneTouch Lancets Miscellaneous; check blood sugar 1-2 times daily , Dx 250; Therapy: 43AUO4565 to (Last Rx:57Whi7019)  Requested for: 72ERS2017 Ordered    The medication list was reviewed and updated today  Allergies  1  Tylenol TABS   2  Tylenol with Codeine #3 TABS    Vitals   Recorded: 20Nov2017 09:45AM   Heart Rate 91   Respiration 16   Systolic 952   Diastolic 70   Height 5 ft 7 in   Weight 195 lb    BMI Calculated 30 54   BSA Calculated 2   O2 Saturation 97       Physical Exam   Constitutional  General appearance: No acute distress, well appearing and well nourished  Eyes  Conjunctiva and lids: No swelling, erythema or discharge  Ears, Nose, Mouth, and Throat  External inspection of ears and nose: Normal    Oropharynx: Normal with no erythema, edema, exudate or lesions  Pulmonary  Auscultation of lungs: Clear to auscultation  Cardiovascular  Palpation of heart: Normal PMI, no thrills  Auscultation of heart: Normal rate and rhythm, normal S1 and S2, without murmurs  Lymphatic  Palpation of lymph nodes in neck: No lymphadenopathy     Musculoskeletal  Gait and station: Normal    Digits and nails: Normal without clubbing or cyanosis  Inspection/palpation of joints, bones, and muscles: Abnormal  -- mild tenderness in cervical spine and thoracic spine and paraspinous ms , normal range of motion at shoulders  Skin  Skin and subcutaneous tissue: Normal without rashes or lesions  Neurologic  Cranial nerves: Cranial nerves 2-12 intact  Reflexes: 2+ and symmetric  Sensation: No sensory loss  Psychiatric  Orientation to person, place, and time: Normal    Mood and affect: Normal          Results/Data  * XR Lumbosacral Spine Complete 4 Views (non-injury) 00Rmi1711 11:21AM Roya Roll     Test Name Result Flag Reference   XR LSpine>4 View (Report)       [address] [place] [address];;[place];[address]  08/28/2015 1125  08/28/2015 1132  5 IMAGES   LUMBAR SPINE   INDICATION- Chronic low back pain radiating into both legs  COMPARISON- April 10, 2006   VIEWS- AP, lateral, bilateral oblique and coned down projections& 66  images   FINDINGS-   Alignment is unremarkable  There is no radiographic evidence of acute fracture or destructive  osseous lesion  Mild disc space narrowing is seen at the L5-S1 level  The remaining  disc spaces appear preserved  Disc spaces appear preserved  Visualized soft tissues appear unremarkable  IMPRESSION-   Stable lumbar spine exam  No significant degenerative change      Transcribed on- SYL Hunter MD  [place] Radiologist- SYL Abad MD  Electronically Signed- SYL Abad MD  Released Date Time- 08/28/15 1539  ------------------------------------------------------------------------------  [address] [facility]RIT  [address]RIT       Signatures   Electronically signed by : HIRA Gutierrez ; Nov 20 2017 10:23AM EST                       (Author)

## 2017-12-01 ENCOUNTER — GENERIC CONVERSION - ENCOUNTER (OUTPATIENT)
Dept: OTHER | Facility: OTHER | Age: 42
End: 2017-12-01

## 2017-12-01 ENCOUNTER — HOSPITAL ENCOUNTER (OUTPATIENT)
Dept: RADIOLOGY | Age: 42
Discharge: HOME | End: 2017-12-01
Attending: FAMILY MEDICINE
Payer: COMMERCIAL

## 2017-12-01 ENCOUNTER — HOSPITAL ENCOUNTER (OUTPATIENT)
Dept: RADIOLOGY | Age: 42
Discharge: HOME | End: 2017-12-01
Payer: COMMERCIAL

## 2017-12-01 ENCOUNTER — APPOINTMENT (OUTPATIENT)
Dept: RADIOLOGY | Age: 42
End: 2017-12-01
Attending: FAMILY MEDICINE
Payer: COMMERCIAL

## 2017-12-01 ENCOUNTER — APPOINTMENT (OUTPATIENT)
Dept: RADIOLOGY | Age: 42
End: 2017-12-01
Payer: COMMERCIAL

## 2017-12-01 DIAGNOSIS — M54.12 RADICULOPATHY OF CERVICAL REGION: ICD-10-CM

## 2017-12-01 DIAGNOSIS — Z12.31 ENCOUNTER FOR SCREENING MAMMOGRAM FOR MALIGNANT NEOPLASM OF BREAST: ICD-10-CM

## 2017-12-01 DIAGNOSIS — M54.2 NECK PAIN: ICD-10-CM

## 2017-12-01 DIAGNOSIS — M54.9 DORSALGIA: ICD-10-CM

## 2017-12-01 PROCEDURE — 72141 MRI NECK SPINE W/O DYE: CPT

## 2017-12-01 PROCEDURE — G0202 SCR MAMMO BI INCL CAD: HCPCS

## 2017-12-01 PROCEDURE — 72072 X-RAY EXAM THORAC SPINE 3VWS: CPT

## 2017-12-01 PROCEDURE — 72050 X-RAY EXAM NECK SPINE 4/5VWS: CPT

## 2017-12-01 PROCEDURE — 77063 BREAST TOMOSYNTHESIS BI: CPT

## 2017-12-04 ENCOUNTER — GENERIC CONVERSION - ENCOUNTER (OUTPATIENT)
Dept: OTHER | Facility: OTHER | Age: 42
End: 2017-12-04

## 2017-12-05 ENCOUNTER — APPOINTMENT (OUTPATIENT)
Dept: LAB | Facility: CLINIC | Age: 42
End: 2017-12-05
Payer: COMMERCIAL

## 2017-12-05 DIAGNOSIS — E10.9 TYPE 1 DIABETES MELLITUS WITHOUT COMPLICATIONS (HCC): ICD-10-CM

## 2017-12-05 LAB
ALBUMIN SERPL BCP-MCNC: 3.3 G/DL (ref 3.5–5)
ALP SERPL-CCNC: 116 U/L (ref 46–116)
ALT SERPL W P-5'-P-CCNC: 23 U/L (ref 12–78)
ANION GAP SERPL CALCULATED.3IONS-SCNC: 9 MMOL/L (ref 4–13)
AST SERPL W P-5'-P-CCNC: 15 U/L (ref 5–45)
BILIRUB SERPL-MCNC: 0.4 MG/DL (ref 0.2–1)
BUN SERPL-MCNC: 10 MG/DL (ref 5–25)
CALCIUM SERPL-MCNC: 8.9 MG/DL (ref 8.3–10.1)
CHLORIDE SERPL-SCNC: 103 MMOL/L (ref 100–108)
CHOLEST SERPL-MCNC: 186 MG/DL (ref 50–200)
CO2 SERPL-SCNC: 29 MMOL/L (ref 21–32)
CREAT SERPL-MCNC: 0.72 MG/DL (ref 0.6–1.3)
ERYTHROCYTE [DISTWIDTH] IN BLOOD BY AUTOMATED COUNT: 12.8 % (ref 11.6–15.1)
EST. AVERAGE GLUCOSE BLD GHB EST-MCNC: 151 MG/DL
GFR SERPL CREATININE-BSD FRML MDRD: 102 ML/MIN/1.73SQ M
GLUCOSE P FAST SERPL-MCNC: 126 MG/DL (ref 65–99)
HBA1C MFR BLD: 6.9 % (ref 4.2–6.3)
HCT VFR BLD AUTO: 38.9 % (ref 34.8–46.1)
HDLC SERPL-MCNC: 54 MG/DL (ref 40–60)
HGB BLD-MCNC: 12 G/DL (ref 11.5–15.4)
LDLC SERPL CALC-MCNC: 102 MG/DL (ref 0–100)
MCH RBC QN AUTO: 25.7 PG (ref 26.8–34.3)
MCHC RBC AUTO-ENTMCNC: 30.8 G/DL (ref 31.4–37.4)
MCV RBC AUTO: 83 FL (ref 82–98)
PLATELET # BLD AUTO: 260 THOUSANDS/UL (ref 149–390)
PMV BLD AUTO: 10.1 FL (ref 8.9–12.7)
POTASSIUM SERPL-SCNC: 3.6 MMOL/L (ref 3.5–5.3)
PROT SERPL-MCNC: 7.2 G/DL (ref 6.4–8.2)
RBC # BLD AUTO: 4.67 MILLION/UL (ref 3.81–5.12)
SODIUM SERPL-SCNC: 141 MMOL/L (ref 136–145)
TRIGL SERPL-MCNC: 152 MG/DL
WBC # BLD AUTO: 8.08 THOUSAND/UL (ref 4.31–10.16)

## 2017-12-05 PROCEDURE — 85027 COMPLETE CBC AUTOMATED: CPT

## 2017-12-05 PROCEDURE — 83036 HEMOGLOBIN GLYCOSYLATED A1C: CPT

## 2017-12-05 PROCEDURE — 36415 COLL VENOUS BLD VENIPUNCTURE: CPT

## 2017-12-05 PROCEDURE — 80053 COMPREHEN METABOLIC PANEL: CPT

## 2017-12-05 PROCEDURE — 80061 LIPID PANEL: CPT

## 2017-12-07 ENCOUNTER — GENERIC CONVERSION - ENCOUNTER (OUTPATIENT)
Dept: OTHER | Facility: OTHER | Age: 42
End: 2017-12-07

## 2017-12-07 DIAGNOSIS — M54.12 RADICULOPATHY OF CERVICAL REGION: ICD-10-CM

## 2017-12-07 DIAGNOSIS — E10.9 TYPE 1 DIABETES MELLITUS WITHOUT COMPLICATIONS (HCC): ICD-10-CM

## 2017-12-27 ENCOUNTER — GENERIC CONVERSION - ENCOUNTER (OUTPATIENT)
Dept: FAMILY MEDICINE CLINIC | Facility: CLINIC | Age: 42
End: 2017-12-27

## 2017-12-27 ENCOUNTER — APPOINTMENT (OUTPATIENT)
Dept: PHYSICAL THERAPY | Facility: CLINIC | Age: 42
End: 2017-12-27
Payer: COMMERCIAL

## 2017-12-27 DIAGNOSIS — M54.12 RADICULOPATHY OF CERVICAL REGION: ICD-10-CM

## 2017-12-27 PROCEDURE — 97162 PT EVAL MOD COMPLEX 30 MIN: CPT

## 2017-12-27 PROCEDURE — 97110 THERAPEUTIC EXERCISES: CPT

## 2017-12-27 PROCEDURE — G8984 CARRY CURRENT STATUS: HCPCS

## 2017-12-27 PROCEDURE — G8985 CARRY GOAL STATUS: HCPCS

## 2018-01-03 ENCOUNTER — APPOINTMENT (OUTPATIENT)
Dept: PHYSICAL THERAPY | Facility: CLINIC | Age: 43
End: 2018-01-03
Payer: COMMERCIAL

## 2018-01-03 PROCEDURE — 97112 NEUROMUSCULAR REEDUCATION: CPT

## 2018-01-03 PROCEDURE — 97012 MECHANICAL TRACTION THERAPY: CPT

## 2018-01-03 PROCEDURE — 97110 THERAPEUTIC EXERCISES: CPT

## 2018-01-05 ENCOUNTER — APPOINTMENT (OUTPATIENT)
Dept: PHYSICAL THERAPY | Facility: CLINIC | Age: 43
End: 2018-01-05
Payer: COMMERCIAL

## 2018-01-05 PROCEDURE — 97110 THERAPEUTIC EXERCISES: CPT

## 2018-01-05 PROCEDURE — 97012 MECHANICAL TRACTION THERAPY: CPT

## 2018-01-05 PROCEDURE — 97112 NEUROMUSCULAR REEDUCATION: CPT

## 2018-01-09 ENCOUNTER — APPOINTMENT (OUTPATIENT)
Dept: PHYSICAL THERAPY | Facility: CLINIC | Age: 43
End: 2018-01-09
Payer: COMMERCIAL

## 2018-01-09 PROCEDURE — 97112 NEUROMUSCULAR REEDUCATION: CPT

## 2018-01-09 PROCEDURE — 97110 THERAPEUTIC EXERCISES: CPT

## 2018-01-09 PROCEDURE — 97012 MECHANICAL TRACTION THERAPY: CPT

## 2018-01-10 ENCOUNTER — ALLSCRIPTS OFFICE VISIT (OUTPATIENT)
Dept: OTHER | Facility: OTHER | Age: 43
End: 2018-01-10

## 2018-01-10 NOTE — PROGRESS NOTES
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)   12  Diabetes mellitus out of control (250 02) (E11 65)   13  Disease of nail (703 9) (L60 9)   14  Elevated serum alkaline phosphatase level (790 5) (R74 8)   15  Encounter for routine gynecological examination (V72 31) (Z01 419)   16  Encounter for screening mammogram for breast cancer (V76 12) (Z12 31)   17  Encounter for screening mammogram for malignant neoplasm of breast (V76 12)    (Z12 31)   18  Folliculitis (558 1) (M91 4)   19  Gastritis (535 50) (K29 70)   20  GERD without esophagitis (530 81) (K21 9)   21  Hand pain, unspecified laterality   22  Hyperlipidemia (272 4) (E78 5)   23  Impaired fasting glucose (790 21) (R73 01)   24  Influenza vaccine needed (V04 81) (Z23)   25  Ingrown nail of great toe of right foot (703 0) (L60 0)   26  Intervertebral disc disorders with radiculopathy, lumbar region (724 4) (M51 16)   27  Knee effusion, left (719 06) (M25 462)   28  Left knee pain (719 46) (M25 562)   29  Left lumbar radiculopathy (724 4) (M54 16)   30  Mastitis (611 0) (N61)   31  Need for pneumococcal vaccination (V03 82) (Z23)   32  Need for prophylactic vaccination and inoculation against influenza (V04 81) (Z23)   33  Obesity (278 00) (E66 9)   34  Patellofemoral syndrome (719 46) (M22 2X9)   35  Screening for diabetic retinopathy (V80 2) (Z13 5)   36   Strain of quadriceps muscle, fascia, or tendon (843 8) (S76 119A)   37  Tachypnea (786 06) (R06 82)   38  Type 2 diabetes mellitus (250 00) (E11 9)   39  Visit for screening mammogram (V76 12) (Z12 31)   40  Wart viral (078 10) (B07 9)    Current Meds   1  Accu-Chek Active In Vitro Strip; USE 1 STRIP Twice daily; Therapy: 71ZAY8412 to (Last Rx:04Vfs9699)  Requested for: 01ECN5996 Ordered   2  Atorvastatin Calcium 10 MG Oral Tablet; take 1 tablet by mouth every evening; Therapy: 12FGP4730 to (Evaluate:06Aug2016)  Requested for: 20Tnz1355; Last   Rx:39Fja3907 Ordered   3  Fluticasone Propionate 50 MCG/ACT Nasal Suspension; use 2 sprays in each nostril   once daily; Therapy: 87CAN4214 to ([id number])  Requested for: 31Asb3771; Last   Rx:18Uyg1938 Ordered   4  Janumet  MG Oral Tablet; Take 1 tablet  daily; Therapy: 12YET7919 to (Evaluate:31Jan2017)  Requested for: 38Xet2777; Last   Rx:58Utg1757 Ordered   5  Montelukast Sodium 10 MG Oral Tablet; TAKE 1 TABLET BY MOUTH EVERY DAY IN   THE MORNING; Therapy: 73FGB2632 to (Evaluate:31Jan2017)  Requested for: 28Ucn9387; Last   Rx:46Ypx8128 Ordered   6  Omeprazole 20 MG Oral Capsule Delayed Release; TAKE 1 CAPSULE DAILY; Therapy: 21Jan2016 to (Evaluate:31Jan2017)  Requested for: 54Yaa9504; Last   Rx:18Zxf0142 Ordered   7  OneTouch Lancets Miscellaneous; check blood sugar 1-2 times daily , Dx 250; Therapy: 73HDL9664 to (Last Rx:34Uiv9474)  Requested for: 31MRW8721 Ordered    Allergies    1  Tylenol TABS   2   Tylenol with Codeine #3 TABS    Plan  Need for prophylactic vaccination and inoculation against influenza    · Fluzone Quadrivalent Intramuscular Suspension    Signatures   Electronically signed by : Carlin Cid, ; Sep 20 2016  2:16PM EST                       (Author)    Electronically signed by : HIRA Oakes ; Sep 20 2016  3:40PM EST

## 2018-01-12 ENCOUNTER — APPOINTMENT (OUTPATIENT)
Dept: PHYSICAL THERAPY | Facility: CLINIC | Age: 43
End: 2018-01-12
Payer: COMMERCIAL

## 2018-01-12 PROCEDURE — 97012 MECHANICAL TRACTION THERAPY: CPT

## 2018-01-12 PROCEDURE — 97110 THERAPEUTIC EXERCISES: CPT

## 2018-01-12 NOTE — PROGRESS NOTES
Chief Complaint   1  Neck Pain  Neck pain      Assessment   1  Cervical spine disease (724 9) (M48 9)   2  Cervical radiculitis (723 4) (M54 12)   3  Chronic neck pain (723 1,338 29) (M54 2,G89 29)      Cervical DDD with stenosis  Cervical radiculopathy  I believe her symptoms are multifactorial in origin including some of which has an inflammatory systemic component  She will need a referral to Rheumatology as well  Discussion/Summary   Neck and bilateral upper extremity pain with associated stiffness  Symptoms appear to be multifactorial in origin including cervical DDD and something more systemic  History of Present Illness   HPI: 38year old female presents to the office for neck, shoulder and back pain that started in 2016 after sleeping on a couch for a month  Chronic neck and back pain that has been effecting the shoulders  The pain does tend to wake her up at night  She has been stiff in the mornings  She has been doing physical therapy which has helped with the pain  reports chronic neck pain with radiation to bilateral shoulders  She also complains of some overall stiffness every day in the mornings involving her proximal upper extremity joints shoulders elbows and wrists  I she has been diagnosed with cervical DDD multilevel  She has never had injections  She is in physical therapy  She does see some improvement  Average pain is 4 to 5/10 with frequent flare-ups  Active Problems   1  Abdominal pain (789 00) (R10 9)   2  Abnormal glucose (790 29) (R73 09)   3  Abnormal liver function test (790 6) (R79 89)   4  Allergic rhinitis (477 9) (J30 9)   5  Annual physical exam (V70 0) (Z00 00)   6  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)   7  Cervical radiculitis (723 4) (M54 12)   8  Cervical spine disease (724 9) (M48 9)   9  Chronic left hip pain (719 45,338 29) (M25 552,G89 29)   10  Chronic lower back pain (724 2,338 29) (M54 5,G89 29)   11   Chronic neck pain (723 1,338 29) (M54 2,G89 29)   12  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)   13  Disease of nail (703 9) (L60 9)   14  Elevated serum alkaline phosphatase level (790 5) (R74 8)   15  Encounter for routine gynecological examination (V72 31) (Z01 419)   16  Encounter for screening mammogram for breast cancer (V76 12) (Z12 31)   17  Encounter for screening mammogram for malignant neoplasm of breast (V76 12)      (Z12 31)   18  Folliculitis (675 1) (Q81 6)   19  Gastritis (535 50) (K29 70)   20  GERD without esophagitis (530 81) (K21 9)   21  Hand pain, unspecified laterality   22  Hyperlipidemia (272 4) (E78 5)   23  Impaired fasting glucose (790 21) (R73 01)   24  Influenza vaccine needed (V04 81) (Z23)   25  Ingrown nail of great toe of right foot (703 0) (L60 0)   26  Intervertebral disc disorders with radiculopathy, lumbar region (724 4) (M51 16)   27  Knee effusion, left (719 06) (M25 462)   28  Left knee pain (719 46) (M25 562)   29  Left lumbar radiculopathy (724 4) (M54 16)   30  Lower abdominal pain (789 09) (R10 30)   31  Muscle spasm of back (724 8) (M62 830)   32  Neck muscle strain (847 0) (S16 1XXA)   33  Need for influenza vaccination (V04 81) (Z23)   34  Need for pneumococcal vaccination (V03 82) (Z23)   35  Need for prophylactic vaccination and inoculation against influenza (V04 81) (Z23)   36  Obesity (278 00) (E66 9)   37  Patellofemoral syndrome (719 46) (M22 2X9)   38  Screening for diabetic retinopathy (V80 2) (Z13 5)   39  Shingles rash (053 9) (B02 9)   40  Strain of quadriceps muscle, fascia, or tendon (843 8) (S76 119A)   41  Tachypnea (786 06) (R06 82)   42  Type 2 diabetes mellitus (250 00) (E11 9)   43  Upper back pain (724 5) (M54 9)   44  Vaginal candidiasis (112 1) (B37 3)   45  Visit for screening mammogram (V76 12) (Z12 31)   46   Wart viral (078 10) (B07 9)    Past Medical History    · History of Acute bronchiolitis (466 19) (J21 9)   · History of Acute upper respiratory infection (465 9) (J06 9)   · History of Cough (786 2) (R05)   · History of Dysuria (788 1) (R30 0)   · History of Elevated serum alkaline phosphatase level (790 5) (R74 8)   · History of Encounter for removal of sutures (V58 32) (Z48 02)   · History of Endometriosis (617 9) (N80 9)   · History of acute bronchitis (V12 69) (Z87 09)   · History of acute sinusitis (V12 69) (Z87 09)   · History of allergic rhinitis (V12 69) (Z87 09)   · History of atopic dermatitis (V13 3) (Z87 2)   · History of headache (V13 89) (P03 768)   · History of heartburn (V12 79) (B67 643)   · History of pregnancy (V13 29)   · History of vertigo (V12 49) (Y59 495)   · History of viral warts (V12 09) (Z86 19)   · History of Influenza vaccine needed (V04 81) (Z23)   · History of Menarche (V21 8)   · History of Noninfectious Dermatological Conditions (709 9)   · History of Plantar fascial fibromatosis (728 71) (M72 2)   · History of Visit for screening mammogram (V76 12) (Z12 31)   · History of Well adult on routine health check (V70 0) (Z00 00)    Surgical History    · History of Appendectomy   · History of Total Abdominal Hysterectomy    Family History   Mother    · Family history of Type 2 Diabetes Mellitus  Father    · Family history of Hyperlipidemia  Son    · Family history of Overall Condition: Good    Social History    · Denied: History of Alcohol Use (History)   · Caffeine Use   · Marital History - Currently    · Never A Smoker   · One child    Allergies   1  Tylenol TABS   2  Tylenol with Codeine #3 TABS    Vitals    Recorded: 04QOE1470 11:56AM   Heart Rate 69   Systolic 062   Diastolic 77   Height 5 ft 7 in   Weight 196 lb    BMI Calculated 30 7   BSA Calculated 2     Physical Exam   Awake alert and oriented x3  Affect is appropriate  She is tender to palpation over bilateral trapezius muscles and paraspinal cervical musculature  She is motor and sensory stable C5 through T1 bilaterally           Constitutional - General appearance: Normal  Musculoskeletal - Gait and station: Normal -- Muscle strength/tone: Normal -- Upper extremity compartments: Abnormal -- Lower extremity compartments: Normal       Cardiovascular - Pulses: Normal -- Examination of extremities for edema and/or varicosities: Normal -- Heart - RRR, no murmurs, no rubs, no gallops  Respiratory - Lungs - Clear to auscultation bilaterally, no rales, no rhonci, no wheezes  Lymphatic - Palpation of lymph nodes in other areas: Normal       Skin - Skin and subcutaneous tissue: Normal       Neurologic - Sensation: Normal -- Upper extremity peripheral neuro exam: Normal -- Lower extremity peripheral neuro exam: Normal       Psychiatric - Orientation to person, place, and time: Normal -- Mood and affect: Normal       Eyes      Conjunctiva and lids: Normal        Pupils and irises: Normal        Results/Data   I personally reviewed the films/images/results in the office today  My interpretation follows  MRI Review Cervical spine MRI demonstrates multilevel cervical DDD from C4-C7  Plan   Chronic neck pain    · 1 - Shayy Cisneros MD, Lianna FRASER (Pain Management) Co-Management  *  Status: Active     Requested for: 98BWN4485  Care Summary provided  : Yes   · 3 - Field ELLISON, Bettina Lundy  (Rheumatology) Co-Management  *  Status: Hold For - Scheduling     Requested for: 00NNU5677  are Referring to a non- Preferred Provider : Services not provided in network  Care Summary provided  : Yes      Pain management for cervical epidural steroid injections    Follow-up Rheumatology for further workup       Signatures    Electronically signed by : HIRA Nevarez ; George 10 2018 12:18PM EST                       (Author)

## 2018-01-14 VITALS
WEIGHT: 190 LBS | SYSTOLIC BLOOD PRESSURE: 102 MMHG | HEART RATE: 72 BPM | HEIGHT: 72 IN | DIASTOLIC BLOOD PRESSURE: 72 MMHG | RESPIRATION RATE: 14 BRPM | BODY MASS INDEX: 25.73 KG/M2 | TEMPERATURE: 98.5 F

## 2018-01-14 VITALS
SYSTOLIC BLOOD PRESSURE: 120 MMHG | OXYGEN SATURATION: 97 % | HEART RATE: 91 BPM | RESPIRATION RATE: 16 BRPM | HEIGHT: 67 IN | WEIGHT: 195 LBS | DIASTOLIC BLOOD PRESSURE: 70 MMHG | BODY MASS INDEX: 30.61 KG/M2

## 2018-01-14 VITALS
BODY MASS INDEX: 29.35 KG/M2 | WEIGHT: 187 LBS | DIASTOLIC BLOOD PRESSURE: 72 MMHG | SYSTOLIC BLOOD PRESSURE: 118 MMHG | HEART RATE: 83 BPM | RESPIRATION RATE: 16 BRPM | HEIGHT: 67 IN

## 2018-01-14 VITALS
HEART RATE: 82 BPM | DIASTOLIC BLOOD PRESSURE: 72 MMHG | BODY MASS INDEX: 30.13 KG/M2 | WEIGHT: 192 LBS | SYSTOLIC BLOOD PRESSURE: 128 MMHG | HEIGHT: 67 IN | RESPIRATION RATE: 16 BRPM

## 2018-01-15 NOTE — RESULT NOTES
Verified Results  (1) CBC/ PLT (NO DIFF) [id number] 08:45AM Joel Goyal Order Number: [id number]     Test Name Result Flag Reference   HEMATOCRIT 38 7 %  34 8-46 1   HEMOGLOBIN 12 3 g/dL  11 5-15 4   MCHC 31 8 g/dL  31 4-37 4   MCH 26 3 pg L 26 8-34 3   MCV 83 fL  82-98   PLATELET COUNT 472 Thousands/uL  149-390   RBC COUNT 4 68 Million/uL  3 81-5 12   RDW 13 3 %  11 6-15 1   WBC COUNT 7 56 Thousand/uL  4 31-10 16   MPV 9 5 fL  8 9-12 7     (1) COMPREHENSIVE METABOLIC PANEL [id number] 37:12ZD Joel Goyal Order Number: [id number]     Test Name Result Flag Reference   SODIUM 140 mmol/L  136-145   POTASSIUM 4 2 mmol/L  3 5-5 3   CHLORIDE 105 mmol/L  100-108   CARBON DIOXIDE 27 mmol/L  21-32   ANION GAP (CALC) 8 mmol/L  4-13   BLOOD UREA NITROGEN 9 mg/dL  5-25   CREATININE 0 65 mg/dL  0 60-1 30   Standardized to IDMS reference method   CALCIUM 8 7 mg/dL  8 3-10 1   BILI, TOTAL 0 50 mg/dL  0 20-1 00   ALK PHOSPHATAS 103 U/L     ALT (SGPT) 24 U/L  12-78   Specimen collection should occur prior to Sulfasalazine administration due to the potential for falsely depressed results  AST(SGOT) 12 U/L  5-45   Specimen collection should occur prior to Sulfasalazine administration due to the potential for falsely depressed results  ALBUMIN 3 2 g/dL L 3 5-5 0   TOTAL PROTEIN 7 1 g/dL  6 4-8 2   eGFR 108 ml/min/1 73sq m     [facility] Disease Education Program recommendations are as follows:  GFR calculation is accurate only with a steady state creatinine  Chronic Kidney disease less than 60 ml/min/1 73 sq  meters  Kidney failure less than 15 ml/min/1 73 sq  meters  GLUCOSE FASTING 120 mg/dL H 65-99   Specimen collection should occur prior to Sulfasalazine administration due to the potential for falsely depressed results  Specimen collection should occur prior to Sulfapyridine administration due to the potential for falsely elevated results       (1) HEMOGLOBIN A1C [id number] 08:45AM [facility] Order Number: [id number]     Test Name Result Flag Reference   HEMOGLOBIN A1C 6 6 % H 4 2-6 3   EST  AVG  GLUCOSE 143 mg/dl       (1) LIPID PANEL, FASTING 29Aug2017 08:45AM [facility] Order Number: [id number]     Test Name Result Flag Reference   CHOLESTEROL 174 mg/dL     HDL,DIRECT 46 mg/dL  40-60   Specimen collection should occur prior to Metamizole administration due to the potential for falsley depressed results  LDL CHOLESTEROL CALCULATED 96 mg/dL  0-100   - Patient Instructions: This is a fasting blood test  Water,black tea or black  coffee only after 9:00pm the night before test   Drink 2 glasses of water the morning of test       Triglyceride:        Normal ??? ??? ??? ??? ??? ??? ??? <150 mg/dl   ??? ??? ???Borderline High ??? ??? 150-199 mg/dl   ??? ??? ? ?? High ??? ??? ??? ??? ??? ??? ??? 200-499 mg/dl   ??? ??? ? ??Very High ??? ??? ??? ??? ??? >499 mg/dl      Cholesterol:       Desirable ??? ??? ??? ??? <200 mg/dl   ??? ??? Borderline High ??? 200-239 mg/dl   ??? ??? High ??? ??? ??? ??? ??? ??? >239 mg/dl      HDL Cholesterol:       High ??? ???>59 mg/dL   ??? ??? Low ??? ??? <41 mg/dL      This screening LDL is a calculated result  It does not have the accuracy of the Direct Measured LDL in the monitoring of patients with hyperlipidemia and/or statin therapy  Direct Measure LDL (LAJ571) must be ordered separately in these patients  TRIGLYCERIDES 160 mg/dL H <=150   Specimen collection should occur prior to N-Acetylcysteine or Metamizole administration due to the potential for falsely depressed results  (1) TSH 29Aug2017 08:45AM [facility] Order Number: [id number]     Test Name Result Flag Reference   TSH 1 415 uIU/mL  0 358-3 740   - Patient Instructions: This bloodwork is non-fasting  Please drink two glasses of water morning of bloodwork        Patients undergoing fluorescein dye angiography may retain small amounts of fluorescein in the body for 48-72 hours post procedure  Samples containing fluorescein can produce falsely depressed TSH values  If the patient had this procedure,a specimen should be resubmitted post fluorescein clearance            The recommended reference ranges for TSH during pregnancy are as follows:  First trimester 0 1 to 2 5 uIU/mL  Second trimester  0 2 to 3 0 uIU/mL  Third trimester 0 3 to 3 0 uIU/m

## 2018-01-16 ENCOUNTER — APPOINTMENT (OUTPATIENT)
Dept: PHYSICAL THERAPY | Facility: CLINIC | Age: 43
End: 2018-01-16
Payer: COMMERCIAL

## 2018-01-16 PROCEDURE — 97110 THERAPEUTIC EXERCISES: CPT

## 2018-01-16 PROCEDURE — 97012 MECHANICAL TRACTION THERAPY: CPT

## 2018-01-16 PROCEDURE — 97112 NEUROMUSCULAR REEDUCATION: CPT

## 2018-01-18 NOTE — RESULT NOTES
Verified Results  * XR HIP/PELV 2-3 VWS LEFT W PELVIS IF PERFORMED 11Aug2016 05:00PM Jose Gray Order Number: [id number]     Test Name Result Flag Reference   * XR HIP/PELV 2-3 VWS LEFT (Report)     LEFT HIP     INDICATION: Sciatic pain from left hip to the knee  Pain after sitting for long period of time  COMPARISON: None     VIEWS: AP pelvis and 2 coned down views; 3 images     FINDINGS:     There is no fracture or dislocation  No degenerative changes  No lytic or blastic lesions are seen  Soft tissues are unremarkable  IMPRESSION:     No acute osseous abnormality         Workstation performed: [id number]     Signed by:   Cali Cramer DO   8/12/16       Discussion/Summary   normal xray

## 2018-01-19 ENCOUNTER — GENERIC CONVERSION - ENCOUNTER (OUTPATIENT)
Dept: OTHER | Facility: OTHER | Age: 43
End: 2018-01-19

## 2018-01-19 ENCOUNTER — APPOINTMENT (OUTPATIENT)
Dept: PHYSICAL THERAPY | Facility: CLINIC | Age: 43
End: 2018-01-19
Payer: COMMERCIAL

## 2018-01-19 PROCEDURE — 97012 MECHANICAL TRACTION THERAPY: CPT

## 2018-01-19 PROCEDURE — 97112 NEUROMUSCULAR REEDUCATION: CPT

## 2018-01-19 PROCEDURE — 97110 THERAPEUTIC EXERCISES: CPT

## 2018-01-23 ENCOUNTER — APPOINTMENT (OUTPATIENT)
Dept: PHYSICAL THERAPY | Facility: CLINIC | Age: 43
End: 2018-01-23
Payer: COMMERCIAL

## 2018-01-23 VITALS
WEIGHT: 196 LBS | HEART RATE: 69 BPM | DIASTOLIC BLOOD PRESSURE: 77 MMHG | HEIGHT: 67 IN | BODY MASS INDEX: 30.76 KG/M2 | SYSTOLIC BLOOD PRESSURE: 126 MMHG

## 2018-01-23 PROCEDURE — 97110 THERAPEUTIC EXERCISES: CPT

## 2018-01-23 PROCEDURE — 97112 NEUROMUSCULAR REEDUCATION: CPT

## 2018-01-23 PROCEDURE — 97012 MECHANICAL TRACTION THERAPY: CPT

## 2018-01-23 NOTE — RESULT NOTES
Verified Results  * MRI CERVICAL SPINE WO CONTRAST [id number] 01:56PM Staci Copper     Test Name Result Flag Reference   MRI CERVICAL SPINE WO CONTRAST (Report)     MRI CERVICAL SPINE WITHOUT CONTRAST     INDICATION: M54 2: Cervicalgia  History taken directly from the electronic ordering system  Pain to the right side of the neck radiating into the right shoulder for 6-7 months  Was taking care of elderly parents and traveling  COMPARISON: None  TECHNIQUE: Sagittal T1, sagittal T2, sagittal inversion recovery, axial T2, axial 2D merge        IMAGE QUALITY: Diagnostic     FINDINGS:     ALIGNMENT: Normal alignment of the cervical spine  No compression fracture  No subluxation  No scoliosis  MARROW SIGNAL: Scattered degenerative endplate changes  No focally suspicious marrow lesions  No bone marrow edema or compression abnormality  Small hemangioma anterior inferior aspect of C7  CERVICAL AND VISUALIZED THORACIC CORD: Normal signal within the visualized cord  PREVERTEBRAL AND PARASPINAL SOFT TISSUES:  Normal          VISUALIZED POSTERIOR FOSSA: The visualized posterior fossa demonstrates no abnormal signal      CERVICAL DISC SPACES:        C2-C3: Normal      C3-C4: There is a diffuse disk bulge  No significant central canal or neural foraminal narrowing  C4-C5: There is a diffuse disk bulge  No significant central canal or neural foraminal narrowing  C5-C6: Small central/right paracentral disc herniation, protrusion type  Mild central canal and right neural foraminal narrowing  Left neural foramen patent  C6-C7: Small central disc herniation, protrusion type  Minimal central canal narrowing  Neural foramina bilaterally patent  C7-T1: Normal      UPPER THORACIC DISC SPACES: Normal        IMPRESSION:     Mild spondylosis  Workstation performed: [id number]     Signed by:    Natalia Smiley MD   12/2/17     MAMMO SCREENING BILATERAL W 3D & CAD [id number] 01:55PM Judy Andrea Lambert Order Number: [id number]    - Patient Instructions: To schedule this appointment, please contact Central Scheduling at [phone]  Do not wear any perfume, powder, lotion or deodorant on breast or underarm area  Please bring your doctors order, referral (if needed) and insurance information with you on the day of the test  Failure to bring this information may result in this test being rescheduled  Arrive 15 minutes prior to your appointment time to register  On the day of your test, please bring any prior mammogram or breast studies with you that were not performed at a [facility]  Failure to bring prior exams may result in your test needing to be rescheduled  Test Name Result Flag Reference   MAMMO SCREENING BILATERAL W 3D & CAD (Report)     Patient History:   No known family history of cancer  Took hormonal contraceptives for 4 years  Patient has never smoked  Patient's BMI is 30 5  Reason for exam: screening, asymptomatic  Mammo Screening Bilateral W DBT and CAD: December 1, 2017 - Check   In #: [de-identified]   2D/3D Procedure   3D views: Bilateral MLO view(s) were taken  2D views: Bilateral CC view(s) were taken  Technologist: RT Chandler(R)(M)   Prior study comparison: November 4, 2015, digital bilateral    screening mammogram performed at [address] [place] [address]  May 16, 2014, bilateral mammogram, performed at [facility]  The breast tissue is heterogeneously dense, potentially limiting    the sensitivity of mammography  Patient risk, included in this    report, assists in determining the appropriate screening regimen    (such as 3-D mammography or the inclusion of automated breast    ultrasound or MRI)  3-D mammography may also remain indicated as    screening       No dominant soft tissue mass, architectural distortion or    suspicious calcifications are noted in either breast   The skin    and nipple contours are within normal limits  No significant changes when compared with prior studies  ACR BI-RADSï¾® Assessments: BiRad:1 - Negative     Recommendation:   Routine screening mammogram of both breasts in 1 year  A    reminder letter will be scheduled  The patient is scheduled in a reminder system for screening    mammography  8-10% of cancers will be missed on mammography  Management of a    palpable abnormality must be based on clinical grounds  Patients    will be notified of their results via letter from our facility  Accredited by [employer] of [employer] and FDA       Transcription Location: [address] [place], [place] [address]way: [id number]     Risk Value(s):   Tyrer-Cuzick 10 Year: 1 500%, Tyrer-Cuzick Lifetime: 9 100%,    Myriad Table: 1 5%, DEE DEE 5 Year: 0 8%, NCI Lifetime: 9 5%

## 2018-01-23 NOTE — MISCELLANEOUS
Message   Recorded as Task   Date: 01/11/2018 01:34 PM, Created By: Yamileth Sarmiento   Task Name: Miscellaneous   Assigned To: Lily Baker   Regarding Patient: Keli Wan, Status: Active   Comment:    Lily Baker - 11 Jan 2018 1:34 PM     TASK CREATED  hi, pt is calling being rereferred from Dr Badillo  she was last seen in our office in 2015  she is going to be traveling to [place] and will be out of the country in the beginning of february and would like to see if she can be seen before she travels  Is there a time when we can get her in? it would also be for a new problem so it would need to be for 30 minutes  please advise on what I can do  Thank you   Lydia Tang - 11 Jan 2018 1:42 PM     TASK REPLIED TO: Previously Assigned To Lydia Tang  put her on at 10:30am on 1/24 please   Lily Baker - 11 Jan 2018 1:58 PM     TASK EDITED  ok thank you   Lydia Tang - 11 Jan 2018 2:46 PM     TASK REPLIED TO: Previously Assigned To Lydia Tang  sure     Signatures   Electronically signed by :  Len Koyanagi, ; Jan 19 2018  6:47AM EST                       (Author)

## 2018-01-24 ENCOUNTER — OFFICE VISIT (OUTPATIENT)
Dept: PAIN MEDICINE | Facility: CLINIC | Age: 43
End: 2018-01-24
Payer: COMMERCIAL

## 2018-01-24 VITALS
TEMPERATURE: 97.4 F | SYSTOLIC BLOOD PRESSURE: 124 MMHG | BODY MASS INDEX: 31.66 KG/M2 | WEIGHT: 197 LBS | DIASTOLIC BLOOD PRESSURE: 78 MMHG | HEIGHT: 66 IN | HEART RATE: 86 BPM

## 2018-01-24 VITALS
HEIGHT: 67 IN | HEART RATE: 86 BPM | WEIGHT: 196 LBS | DIASTOLIC BLOOD PRESSURE: 78 MMHG | BODY MASS INDEX: 30.76 KG/M2 | RESPIRATION RATE: 16 BRPM | SYSTOLIC BLOOD PRESSURE: 130 MMHG | OXYGEN SATURATION: 98 %

## 2018-01-24 DIAGNOSIS — M50.120 CERVICAL DISC DISORDER WITH RADICULOPATHY OF MID-CERVICAL REGION: Primary | ICD-10-CM

## 2018-01-24 DIAGNOSIS — M51.16 INTERVERTEBRAL DISC DISORDERS WITH RADICULOPATHY, LUMBAR REGION: ICD-10-CM

## 2018-01-24 PROBLEM — B02.9 SHINGLES RASH: Status: ACTIVE | Noted: 2017-09-07

## 2018-01-24 PROBLEM — K21.9 GASTROESOPHAGEAL REFLUX DISEASE WITHOUT ESOPHAGITIS: Status: ACTIVE | Noted: 2018-01-24

## 2018-01-24 PROCEDURE — 99214 OFFICE O/P EST MOD 30 MIN: CPT | Performed by: ANESTHESIOLOGY

## 2018-01-24 RX ORDER — OMEPRAZOLE 10 MG/1
CAPSULE, DELAYED RELEASE ORAL
COMMUNITY
Start: 2016-01-21 | End: 2018-04-02

## 2018-01-24 RX ORDER — MONTELUKAST SODIUM 10 MG/1
TABLET ORAL
COMMUNITY
Start: 2012-09-24 | End: 2018-08-27 | Stop reason: SDUPTHER

## 2018-01-24 NOTE — PATIENT INSTRUCTIONS
Epidural Steroid Injection   AMBULATORY CARE:   What you need to know about an epidural steroid injection (SHELLEY):  An SHELLEY is a procedure to inject steroid medicine into the epidural space  The epidural space is between your spinal cord and vertebrae  Steroids reduce inflammation and fluid buildup in your spine that may be causing pain  You may be given pain medicine along with the steroids  How to prepare for an SHELLEY:  Your healthcare provider will talk to you about how to prepare for your procedure  He will tell you what medicines to take or not take on the day of your procedure  You may need to stop taking blood thinners or other medicines several days before your procedure  You may need to adjust any diabetes medicine you take on the day of your procedure  Steroid medicine can increase your blood sugar level  What will happen during an SHELLEY:   · You will be given medicine to numb the procedure area  You will be awake for the procedure, but you will not feel pain  You may also be given medicine to help you relax during the procedure  Contrast liquid will be used to help your healthcare provider see the area better  Tell the healthcare provider if you have ever had an allergic reaction to contrast liquid  · Your healthcare provider may place the needle into your neck area, middle of your back, or tailbone area  He may inject the medicine next to the nerves that are causing your pain  He may instead inject the medicine into a larger area of the epidural space  This helps the medicine spread to more nerves  Your healthcare provider will use a fluoroscope to help guide the needle to the right place  A fluoroscope is a type of x-ray  After the procedure, a bandage will be placed over the injection site to prevent infection  Care for your wound as directed: You may remove the bandage before you go to bed the day of your procedure  You may take a shower, but do not take a bath for at least 24 hours  Self-care:   · Do not drive,  use machines, or do strenuous activity for 24 hours after your procedure or as directed  · Continue other treatments  as directed  Steroid injections alone will not control your pain  The injections are meant to be used with other treatments, such as physical therapy  Seek care immediately if:   · Blood soaks through your bandage  · Your wound is red, swollen, or draining pus  · You have a fever or chills, severe back pain, and the procedure area is sensitive to the touch  · You have a seizure  · You have trouble moving your legs  · You have weakness or numbness in your legs  · You cannot control when you urinate or have a bowel movement  Contact your healthcare provider if:   · You have nausea or are vomiting  · Your face or neck is red for a few days and you feel warm  · You have more pain than you had before the procedure  · You have swelling in your hands or feet  · You have questions or concerns about your condition or care  Follow up with your healthcare provider as directed:  Write down your questions so you remember to ask them during your visits  © 2017 [employer] [employer] Information is for End User's use only and may not be sold, redistributed or otherwise used for commercial purposes  All illustrations and images included in CareNotes® are the copyrighted property of A D A M , Inc  or Alejandro Gastelum  The above information is an  only  It is not intended as medical advice for individual conditions or treatments  Talk to your doctor, nurse or pharmacist before following any medical regimen to see if it is safe and effective for you

## 2018-01-24 NOTE — PROGRESS NOTES
Assessment:  1  Cervical disc disorder with radiculopathy of mid-cervical region        Plan:  The patient is currently experiencing neck and bilateral arm pain worse on the left related to her cervical disc herniations  At this time, I discussed performing a cervical epidural steroid injection to help reduce swelling and inflammation which is leading to her pain symptoms  She was apprised of the most common risks and would like to proceed she will be scheduled for an upcoming Tuesday or Thursday under fluoroscopic guidance  The patient was advised that she may continue with her traction since that is helpful  We will also schedule her for a repeat of the lumbar injection after she returns from [facility]  Complete risks and benefits including bleeding, infection, tissue reaction, nerve injury and allergic reaction were discussed  The approach was demonstrated using models and literature was provided  Verbal and written consent was obtained  History of Present Illness: The patient is a 38 y o  female who presents for a follow up office visit in regards to Neck Pain; Back Pain; Arm Pain (both arms); and Leg Pain (both legs)  The patient was previously seen in 2015 and had undergone a I lumbar epidural steroid injection with excellent relief  She reports that her neck symptoms are worse for her currently  She experiences constant pain in the neck that radiates down both arms worse on the left described to be throbbing and shooting  She has difficulty with turning her head to the left  In addition, she is having recurrence of pain in the lower back radiating down both legs  She would like to repeat the epidural steroid injection for the low back as well  She has been undergoing traction which provides moderate relief      I have personally reviewed and/or updated the patient's past medical history, past surgical history, family history, social history, current medications, allergies, and vital signs today        Review of Systems  Review of Systems   Respiratory: Negative for shortness of breath  Cardiovascular: Negative for chest pain  Gastrointestinal: Negative for constipation, diarrhea, nausea and vomiting  Musculoskeletal: Negative for arthralgias, gait problem, joint swelling and myalgias  Skin: Negative for rash  Neurological: Negative for dizziness, seizures and weakness  Patient Active Problem List   Diagnosis    Hyperlipidemia    Type 2 diabetes mellitus ([facility] )    Intervertebral disc disorders with radiculopathy, lumbar region    Gastroesophageal reflux disease without esophagitis    Shingles rash    Cervical disc disorder with radiculopathy of mid-cervical region       Past Medical History:   Diagnosis Date    GERD (gastroesophageal reflux disease)     Hyperlipidemia     Intervertebral disc disorders with radiculopathy, lumbar region     Type 2 diabetes mellitus ([facility] )        Past Surgical History:   Procedure Laterality Date    APPENDECTOMY      TOTAL ABDOMINAL HYSTERECTOMY         No family history on file  Social History     Occupational History    Not on file       Social History Main Topics    Smoking status: Not on file    Smokeless tobacco: Not on file    Alcohol use Not on file    Drug use: Unknown    Sexual activity: Not on file         Current Outpatient Prescriptions:     montelukast (SINGULAIR) 10 mg tablet, Take by mouth, Disp: , Rfl:     omeprazole (PriLOSEC) 10 mg delayed release capsule, Take by mouth, Disp: , Rfl:     ONE TOUCH LANCETS MISC, OneTouch Lancets Miscellaneous check blood sugar 1-2 times daily , Dx 250  Quantity: 1;  Refills: Montse JUAN Ostrander ;  Start 8-July-2014 Active 200 Miscellaneous Box, Disp: , Rfl:     sitaGLIPtin-metFORMIN (JANUMET)  MG per tablet, Take 1 tablet by mouth daily, Disp: , Rfl:     Allergies   Allergen Reactions    Acetaminophen-Codeine      Reaction Date: [id number];        Physical Exam:    /78 Pulse 86   Temp (!) 97 4 °F (36 3 °C)   Ht 5' 6" (1 676 m)   Wt 89 4 kg (197 lb)   BMI 31 80 kg/m²     Constitutional:normal, well developed, well nourished, alert, in no distress and non-toxic and no overt pain behavior  Eyes:anicteric  HEENT:grossly intact  Neck:supple, symmetric, trachea midline and no masses   Pulmonary:even and unlabored  Cardiovascular:No edema or pitting edema present  Skin:Normal without rashes or lesions and well hydrated  Psychiatric:Mood and affect appropriate  Neurologic:Cranial Nerves II-XII grossly intact  Musculoskeletal:normal     Cervical Spine Exam    Appearance:  Normal lordosis  Palpation/Tenderness:  left cervical paraspinal tenderness  right cervical paraspinal tenderness  left trapezium tenderness  right trapezium tenderness  Sensory:  no sensory deficits noted  Range of Motion:  Flexion: Moderately limited  with pain  Extension:  Moderately limited  with pain  Lateral Flexion - Left:  Moderately limited  with pain  Lateral Flexion - Right:  Minimally limited  with pain  Rotation - Left:  Moderately limited  with pain  Rotation - Right:  Moderately limited  with pain  Motor Strength:  Left Arm Flexion  5/5  Left Arm Extension  5/5  Right Arm Flexion  5/5  Right Arm Extension  5/5  Left Wrist Flexion  5/5  Left Wrist Extension  5/5  Left Finger Abduction  5/5  Right Finger Abduction  5/5  Left Pincer Grasp  5/5  Right Pincer Grasp  5/5  Left    5/5  Right   5/5  Special Tests:  Left Spurlings:  positive  Right Spurlings  negative      Imaging  FL spine and pain procedure    (Results Pending)   MRI CERVICAL SPINE WITHOUT CONTRAST     INDICATION:  M54 2: Cervicalgia  History taken directly from the electronic ordering system  Pain to the right side of the neck radiating into the right shoulder for 6-7 months   Was taking care of elderly parents and traveling       COMPARISON:  None      TECHNIQUE:  Sagittal T1, sagittal T2, sagittal inversion recovery, axial T2, axial  2D merge          IMAGE QUALITY:  Diagnostic     FINDINGS:     ALIGNMENT:  Normal alignment of the cervical spine  No compression fracture  No subluxation  No scoliosis      MARROW SIGNAL:  Scattered degenerative endplate changes  No focally suspicious marrow lesions  No bone marrow edema or compression abnormality  Small hemangioma anterior inferior aspect of C7       CERVICAL AND VISUALIZED THORACIC CORD:  Normal signal within the visualized cord      PREVERTEBRAL AND PARASPINAL SOFT TISSUES:   Normal              VISUALIZED POSTERIOR FOSSA:  The visualized posterior fossa demonstrates no abnormal signal      CERVICAL DISC SPACES:          C2-C3:  Normal      C3-C4:  There is a diffuse disk bulge  No significant central canal or neural foraminal narrowing       C4-C5:  There is a diffuse disk bulge  No significant central canal or neural foraminal narrowing       C5-C6:  Small central/right paracentral disc herniation, protrusion type  Mild central canal and right neural foraminal narrowing  Left neural foramen patent      C6-C7:  Small central disc herniation, protrusion type  Minimal central canal narrowing    Neural foramina bilaterally patent      C7-T1:  Normal      UPPER THORACIC DISC SPACES:  Normal      IMPRESSION:     Mild spondylosis        Orders Placed This Encounter   Procedures    FL spine and pain procedure

## 2018-01-25 ENCOUNTER — OFFICE VISIT (OUTPATIENT)
Dept: PHYSICAL THERAPY | Facility: CLINIC | Age: 43
End: 2018-01-25
Payer: COMMERCIAL

## 2018-01-25 DIAGNOSIS — M50.120 CERVICAL DISC DISORDER WITH RADICULOPATHY OF MID-CERVICAL REGION: ICD-10-CM

## 2018-01-25 DIAGNOSIS — M54.2 NECK PAIN: ICD-10-CM

## 2018-01-25 DIAGNOSIS — M54.12 CERVICAL RADICULOPATHY: Primary | ICD-10-CM

## 2018-01-25 PROCEDURE — 97012 MECHANICAL TRACTION THERAPY: CPT | Performed by: PHYSICAL THERAPIST

## 2018-01-25 PROCEDURE — 97110 THERAPEUTIC EXERCISES: CPT

## 2018-01-25 PROCEDURE — 97112 NEUROMUSCULAR REEDUCATION: CPT

## 2018-01-25 NOTE — PROGRESS NOTES
Daily Note     Today's date: 2018  Patient name: Kali Neville  : 1975  MRN: [id number]  Referring provider: Ember Ivan MD  Dx:   Encounter Diagnoses   Name Primary?  Cervical radiculopathy Yes    Neck pain                   Subjective: Patient reports that she saw Dr Anthony José who is going to inject her cervical spine on 2018  She states that she does get relief with traction but sx return in a day or two  Objective: See treatment diary below  Manual                                                     Exercise Diary  2018       Bike 7'       TB Row, Ext,  RTB 2 x 15       TB B ER RTB 2 x15       Doorway ER St 5 x :10       Cervical Rotation 5 x :10       UT St 5 x :10       Lev Scap St 5 x :10       Chin Tucks :05 x 10                                                                                                           Modalities 2018       Traction 20#, 10', static 4up/down                           Assessment: Tolerated treatment well  Patient exhibited good technqiue with therapeutic exercises      Plan: Progress treatment as tolerated    Patient will have injection next week and then is traveling to [facility] on 2/10 for 2 months

## 2018-01-29 ENCOUNTER — OFFICE VISIT (OUTPATIENT)
Dept: PHYSICAL THERAPY | Facility: CLINIC | Age: 43
End: 2018-01-29
Payer: COMMERCIAL

## 2018-01-29 DIAGNOSIS — M54.12 RADICULOPATHY OF CERVICAL SPINE: ICD-10-CM

## 2018-01-29 DIAGNOSIS — M50.120 CERVICAL DISC DISORDER WITH RADICULOPATHY OF MID-CERVICAL REGION: Primary | ICD-10-CM

## 2018-01-29 DIAGNOSIS — M54.2 NECK PAIN: ICD-10-CM

## 2018-01-29 PROCEDURE — 97110 THERAPEUTIC EXERCISES: CPT

## 2018-01-29 PROCEDURE — 97010 HOT OR COLD PACKS THERAPY: CPT

## 2018-01-29 PROCEDURE — 97012 MECHANICAL TRACTION THERAPY: CPT

## 2018-01-29 NOTE — PROGRESS NOTES
Daily Note     Today's date: 2018  Patient name: Ja Oden  : 1975  MRN: [id number]  Referring provider: Desire Otto MD  Dx:   Encounter Diagnoses   Name Primary?  Cervical disc disorder with radiculopathy of mid-cervical region Yes    Neck pain                   Subjective: Patient states that she has noticed an overall improvement with knee pain but continues to experience RUE pain and cervical pain daily  Patient is scheduled to receive an injection tomorrow  Check status at nv  Precautions: none  Objective: See treatment diary below  Manual                                                     Exercise Diary  2018      Bike 7' 7 min       TB Row, Ext,  RTB 2 x 15 RTB 2 x 15       TB B ER RTB 2 x15 RTB 2 x 15      Doorway ER St 5 x :10 5 x :10      Cervical Rotation 5 x :10       UT St 5 x :10 5  X :10      Lev Scap St 5 x :10 5 x :10       Chin Tucks :05 x 10 :05 x 10       Scap squeezes  :03 2 x 10       Shoulder flexion with wand supine  :05 2 x 10                                                                                           Modalities 2018      Traction 20#, 10', static 4up/down 10 min       MHP   X 10 min                       Assessment: Tolerated treatment well  Plan: Continue per plan of care

## 2018-01-30 ENCOUNTER — HOSPITAL ENCOUNTER (OUTPATIENT)
Dept: RADIOLOGY | Facility: CLINIC | Age: 43
Discharge: HOME | End: 2018-01-30
Attending: ANESTHESIOLOGY
Payer: COMMERCIAL

## 2018-01-30 VITALS
OXYGEN SATURATION: 97 % | SYSTOLIC BLOOD PRESSURE: 139 MMHG | TEMPERATURE: 98.1 F | DIASTOLIC BLOOD PRESSURE: 87 MMHG | HEART RATE: 79 BPM | RESPIRATION RATE: 18 BRPM

## 2018-01-30 PROCEDURE — 62321 NJX INTERLAMINAR CRV/THRC: CPT | Performed by: ANESTHESIOLOGY

## 2018-01-30 RX ORDER — LIDOCAINE HYDROCHLORIDE 10 MG/ML
10 INJECTION, SOLUTION EPIDURAL; INFILTRATION; INTRACAUDAL; PERINEURAL ONCE
Status: COMPLETED | OUTPATIENT
Start: 2018-01-30 | End: 2018-01-30

## 2018-01-30 RX ORDER — METHYLPREDNISOLONE ACETATE 80 MG/ML
80 INJECTION, SUSPENSION INTRA-ARTICULAR; INTRALESIONAL; INTRAMUSCULAR; SOFT TISSUE ONCE
Status: COMPLETED | OUTPATIENT
Start: 2018-01-30 | End: 2018-01-30

## 2018-01-30 RX ADMIN — IOHEXOL 1 ML: 300 INJECTION, SOLUTION INTRAVENOUS at 09:50

## 2018-01-30 RX ADMIN — METHYLPREDNISOLONE ACETATE 80 MG: 80 INJECTION, SUSPENSION INTRA-ARTICULAR; INTRALESIONAL; INTRAMUSCULAR; SOFT TISSUE at 09:50

## 2018-01-30 RX ADMIN — LIDOCAINE HYDROCHLORIDE 4 ML: 10 INJECTION, SOLUTION EPIDURAL; INFILTRATION; INTRACAUDAL; PERINEURAL at 09:50

## 2018-01-30 NOTE — DISCHARGE INSTRUCTIONS
Epidural Steroid Injection   WHAT YOU NEED TO KNOW:   An epidural steroid injection (SHELLEY) is a procedure to inject steroid medicine into the epidural space  The epidural space is between your spinal cord and vertebrae  Steroids reduce inflammation and fluid buildup in your spine that may be causing pain  You may be given pain medicine along with the steroids  ACTIVITY  · Do not drive or operate machinery today  · No strenuous activity today - bending, lifting, etc   · You may resume normal activites starting tomorrow - start slowly and as tolerated  · You may shower today, but no tub baths or hot tubs  · You may have numbness for several hours from the local anesthetic  Please use caution and common sense, especially with weight-bearing activities  CARE OF THE INJECTION SITE  · If you have soreness or pain, apply ice to the area today (20 minutes on/20 minutes off)  · Starting tomorrow, you may use warm, moist heat or ice if needed  · You may have an increase or change in your discomfort for 36-48 hours after your treatment  · Apply ice and continue with any pain medication you have been prescribed  · Notify [facility] if you have any of the following: redness, drainage, swelling, headache, stiff neck or fever above 100°F     SPECIAL INSTRUCTIONS  · Our office will contact you in approximately 7 days for a progress report  MEDICATIONS  · Continue to take all routine medications  · Our office may have instructed you to hold some medications  If you have a problem specifically related to your procedure, please call our office at [phone]  Problems not related to your procedure should be directed to your primary care physician

## 2018-01-30 NOTE — H&P
History of Present Illness: The patient is a 38 y o  female who presents with complaints of neck pain and bilateral arm pain secondary to cervical disc bulging and is here today for cervical epidural steroid injection      Patient Active Problem List   Diagnosis    Hyperlipidemia    Type 2 diabetes mellitus ([facility] )    Intervertebral disc disorders with radiculopathy, lumbar region    Gastroesophageal reflux disease without esophagitis    Shingles rash    Cervical disc disorder with radiculopathy of mid-cervical region       Past Medical History:   Diagnosis Date    GERD (gastroesophageal reflux disease)     Hyperlipidemia     Intervertebral disc disorders with radiculopathy, lumbar region     [facility] ([facility] )        Past Surgical History:   Procedure Laterality Date    APPENDECTOMY      TOTAL ABDOMINAL HYSTERECTOMY           Current Outpatient Prescriptions:     montelukast (SINGULAIR) 10 mg tablet, Take by mouth, Disp: , Rfl:     omeprazole (PriLOSEC) 10 mg delayed release capsule, Take by mouth, Disp: , Rfl:     ONE TOUCH LANCETS MISC, OneTouch Lancets Miscellaneous check blood sugar 1-2 times daily , Dx 250  Quantity: 1;  Refills: Froilan JUAN Belk ;  Start 8-July-2014 Active 200 Miscellaneous Box, Disp: , Rfl:     sitaGLIPtin-metFORMIN (JANUMET)  MG per tablet, Take 1 tablet by mouth daily, Disp: , Rfl:     Allergies   Allergen Reactions    Acetaminophen-Codeine      Reaction Date: 28Sep2007;     Latex Rash       Physical Exam:   Vitals:    01/30/18 0935   BP: 137/86   Pulse: 81   Resp: 18   Temp: 98 1 °F (36 7 °C)   SpO2: 98%     General: Awake, Alert, Oriented x 3, Mood and affect appropriate  Respiratory: Respirations even and unlabored  Cardiovascular: Peripheral pulses intact; no edema  Musculoskeletal Exam:  Bilateral neck tenderness    ASA Score:  2    Assessment:  Cervical disc disorder with radiculopathy    Plan: TIM

## 2018-02-05 ENCOUNTER — OFFICE VISIT (OUTPATIENT)
Dept: PHYSICAL THERAPY | Facility: CLINIC | Age: 43
End: 2018-02-05
Payer: COMMERCIAL

## 2018-02-05 DIAGNOSIS — M50.120 CERVICAL DISC DISORDER WITH RADICULOPATHY OF MID-CERVICAL REGION: Primary | ICD-10-CM

## 2018-02-05 PROCEDURE — 97010 HOT OR COLD PACKS THERAPY: CPT

## 2018-02-05 PROCEDURE — 97110 THERAPEUTIC EXERCISES: CPT

## 2018-02-05 NOTE — PROGRESS NOTES
Daily Note     Today's date: 2018  Patient name: Enmanuel Olson  : 1975  MRN: [id number]  Referring provider: Espinoza Guaman MD  Dx:   Encounter Diagnosis   Name Primary?  Cervical disc disorder with radiculopathy of mid-cervical region Yes                  Subjective: Patient received her injection one week ago but has noticed an increase in b/l cervical neck pain  States that she has noticed an improvement with her radiating symptoms in LUE  Did nto complete alloted time on traction today due to increase in pain  Check status at nv  Objective: See treatment diary below    Precautions: none  Objective: See treatment diary below  Manual                                                     Exercise Diary  2018 2/5     Bike 7' 7 min  6 min      TB Row, Ext,  RTB 2 x 15 RTB 2 x 15  RTB 2 x 10      TB B ER RTB 2 x15 RTB 2 x 15 RTB 2 x 10     Doorway ER St 5 x :10 5 x :10 5  X :10      Cervical Rotation 5 x :10       UT St 5 x :10 5 x :10  Pain      Lev Scap St 5 x :10 5 x :10 pain     Chin Tucks :05 x 10 :05 x 10  :05  X 10     Scap squeezes  :03 2 x 10  :05 x 10     Shoulder flexion with wand supine  :05 2 x 10  :05 x 5                                                                                         Modalities 2018      Traction 20#, 10', static 4up/down 10 min       MHP   X 10 min                 Assessment: Tolerated treatment fair  Increase in pain with cervical stretches and traction  Will check status at nv      Plan: Continue per plan of care

## 2018-02-07 ENCOUNTER — OFFICE VISIT (OUTPATIENT)
Dept: PHYSICAL THERAPY | Facility: CLINIC | Age: 43
End: 2018-02-07
Payer: COMMERCIAL

## 2018-02-07 ENCOUNTER — TELEPHONE (OUTPATIENT)
Dept: RADIOLOGY | Facility: CLINIC | Age: 43
End: 2018-02-07

## 2018-02-07 NOTE — TELEPHONE ENCOUNTER
Patient reports that her left side is better but she has had increased pain on right side - She was in PT the other day and could not handle the traction because it made pain worse  She has increased numbness in right hand  Patient does not have f/u - Please advise next step

## 2018-02-14 ENCOUNTER — OFFICE VISIT (OUTPATIENT)
Dept: PHYSICAL THERAPY | Facility: CLINIC | Age: 43
End: 2018-02-14
Payer: COMMERCIAL

## 2018-02-14 DIAGNOSIS — M54.12 CERVICAL RADICULOPATHY: Primary | ICD-10-CM

## 2018-02-14 DIAGNOSIS — M50.120 CERVICAL DISC DISORDER WITH RADICULOPATHY OF MID-CERVICAL REGION: ICD-10-CM

## 2018-02-14 PROCEDURE — 97140 MANUAL THERAPY 1/> REGIONS: CPT

## 2018-02-14 PROCEDURE — 97110 THERAPEUTIC EXERCISES: CPT

## 2018-02-14 PROCEDURE — G8985 CARRY GOAL STATUS: HCPCS | Performed by: PHYSICAL THERAPIST

## 2018-02-14 PROCEDURE — G8984 CARRY CURRENT STATUS: HCPCS | Performed by: PHYSICAL THERAPIST

## 2018-02-14 PROCEDURE — 97112 NEUROMUSCULAR REEDUCATION: CPT

## 2018-02-14 NOTE — PROGRESS NOTES
Daily Note     Today's date: 2018  Patient name: Jayde Spann  : 1975  MRN: [id number]  Referring provider: Lissa Gaitan MD  Dx:   Encounter Diagnosis   Name Primary?  Cervical radiculopathy Yes                  Subjective:   Patient reports that she has had some pain with traction Lv  She feels improvement in her neck, but states that her Ues are less painful  She states the R UT continues to be tight  She indicates that her LBP is more prominent now due to the neck pain improving  Objective: See treatment diary below  Exercise Diary  2018    Bike 7' 7 min  6 min  6' min    TB Row, Ext,  RTB 2 x 15 RTB 2 x 15  RTB 2 x 10  RTB x 10    TB B ER RTB 2 x15 RTB 2 x 15 RTB 2 x 10 RTB x 10    Doorway ER St 5 x :10 5 x :10 5  X :10  5 x :10    Cervical Rotation 5 x :10       UT St 5 x :10 5 x :10   5 x:10    Lev Scap St 5 x :10 5 x :10  5 x :10    Chin Tucks :05 x 10 :05 x 10  :05  X 10 5 x :10    Scap squeezes  :03 2 x 10  :05 x 10 np    Shoulder flexion with wand supine  :05 2 x 10  :05 x 5 5 x :05                                                                                        Modalities 2018    Traction 20#, 10', static 4up/down 10 min   np    MHP   X 10 min   np    IASTM - B Levator Scap    10'        Assessment: Tolerated treatment well  Patient would benefit from continued PT      Plan: Progress treatment as tolerated

## 2018-02-20 ENCOUNTER — OFFICE VISIT (OUTPATIENT)
Dept: PHYSICAL THERAPY | Facility: CLINIC | Age: 43
End: 2018-02-20
Payer: COMMERCIAL

## 2018-02-20 DIAGNOSIS — M54.2 NECK PAIN: ICD-10-CM

## 2018-02-20 DIAGNOSIS — M50.120 CERVICAL DISC DISORDER WITH RADICULOPATHY OF MID-CERVICAL REGION: ICD-10-CM

## 2018-02-20 DIAGNOSIS — M54.12 CERVICAL RADICULOPATHY: Primary | ICD-10-CM

## 2018-02-20 PROCEDURE — 97140 MANUAL THERAPY 1/> REGIONS: CPT | Performed by: PHYSICAL THERAPIST

## 2018-02-20 PROCEDURE — 97110 THERAPEUTIC EXERCISES: CPT | Performed by: PHYSICAL THERAPIST

## 2018-02-20 NOTE — PROGRESS NOTES
Daily Note     Today's date: 2018  Patient name: Catalino Caruso  : 1975  MRN: [id number]  Referring provider: Martita Patel MD  Dx:   Encounter Diagnoses   Name Primary?  Cervical radiculopathy Yes    Cervical disc disorder with radiculopathy of mid-cervical region     Neck pain                   Subjective:   She states she had some soreness the day after last session and she was feeling better afterwards  Objective: See treatment diary below  Exercise Diary  2018   Bike 7' 7 min  6 min  6' min 6 min   TB Row, Ext,  RTB 2 x 15 RTB 2 x 15  RTB 2 x 10  RTB x 10 RTB x10   TB B ER RTB 2 x15 RTB 2 x 15 RTB 2 x 10 RTB x 10 RTB x 10   Doorway ER St 5 x :10 5 x :10 5  X :10  5 x :10 5x :10   Cervical Rotation 5 x :10       UT St 5 x :10 5 x :10   5 x:10 5x :10   Lev Scap St 5 x :10 5 x :10  5 x :10 5x :10   Chin Tucks :05 x 10 :05 x 10  :05  X 10 5 x :10 5x :10   Scap squeezes  :03 2 x 10  :05 x 10 np    Shoulder flexion with wand supine  :05 2 x 10  :05 x 5 5 x :05 5x :05                                                                                       Modalities 2018   Traction 20#, 10', static 4up/down 10 min   np np   MHP   X 10 min   np    IASTM - B Levator Scap    10' 10'       Assessment: Tolerated treatment well  Patient would benefit from continued PT  Plan: Progress treatment as tolerated

## 2018-02-22 ENCOUNTER — OFFICE VISIT (OUTPATIENT)
Dept: PHYSICAL THERAPY | Facility: CLINIC | Age: 43
End: 2018-02-22
Payer: COMMERCIAL

## 2018-02-22 DIAGNOSIS — M54.12 CERVICAL RADICULOPATHY: Primary | ICD-10-CM

## 2018-02-22 PROCEDURE — 97112 NEUROMUSCULAR REEDUCATION: CPT

## 2018-02-22 PROCEDURE — 97110 THERAPEUTIC EXERCISES: CPT

## 2018-02-22 PROCEDURE — 97140 MANUAL THERAPY 1/> REGIONS: CPT

## 2018-02-22 PROCEDURE — G8985 CARRY GOAL STATUS: HCPCS | Performed by: PHYSICAL THERAPIST

## 2018-02-22 PROCEDURE — G8986 CARRY D/C STATUS: HCPCS | Performed by: PHYSICAL THERAPIST

## 2018-02-22 NOTE — PROGRESS NOTES
PT Discharge    Today's date: 2018  Patient name: Tk Hanson  : 1975  MRN: [id number]  Referring provider: Kj Vela MD  Dx:   Encounter Diagnosis     ICD-10-CM    1  Cervical radiculopathy M54 12                   Subjective: Patient reports that she has felt better since having IASTM treatment  Objective: See treatment diary below  Exercise Diary  2018   Bike 7' 7 min  6 min  6' min 6 min   TB Row, Ext,  RTB 2 x 15 RTB 2 x 15  RTB 2 x 10  RTB x 10 RTB x10   TB B ER RTB 2 x15 RTB 2 x 15 RTB 2 x 10 RTB x 10 RTB x 10   Doorway ER St 5 x :10 5 x :10 5  X :10  5 x :10 5x :10   Cervical Rotation 5 x :10       UT St 5 x :10 5 x :10   5 x:10 5x :10   Lev Scap St 5 x :10 5 x :10  5 x :10 5x :10   Chin Tucks :05 x 10 :05 x 10  :05  X 10 5 x :10 5x :10   Scap squeezes  :03 2 x 10  :05 x 10 np    Shoulder flexion with wand supine  :05 2 x 10  :05 x 5 5 x :05 5x :05                                                                                       Modalities 2018   Traction 20#, 10', static 4up/down 10 min   np np   MHP  10' X 10 min   np    IASTM - B Levator Scap 10'   10' 10'           Assessment:     Plan:  Patient was making good progress in physical therapy  She is [place] out of the [place] and therefore is being discharged at this time

## 2018-03-06 DIAGNOSIS — E10.9 TYPE 1 DIABETES MELLITUS WITHOUT COMPLICATIONS (HCC): ICD-10-CM

## 2018-04-02 DIAGNOSIS — K21.9 GERD WITHOUT ESOPHAGITIS: Primary | ICD-10-CM

## 2018-04-02 RX ORDER — OMEPRAZOLE 20 MG/1
20 CAPSULE, DELAYED RELEASE ORAL DAILY
Qty: 90 CAPSULE | Refills: 0 | Status: SHIPPED | OUTPATIENT
Start: 2018-04-02 | End: 2018-08-27 | Stop reason: SDUPTHER

## 2018-08-07 ENCOUNTER — LAB (OUTPATIENT)
Dept: LAB | Facility: CLINIC | Age: 43
End: 2018-08-07
Payer: COMMERCIAL

## 2018-08-07 DIAGNOSIS — E10.9 TYPE 1 DIABETES MELLITUS WITHOUT COMPLICATIONS (HCC): ICD-10-CM

## 2018-08-07 LAB
ALBUMIN SERPL BCP-MCNC: 3.3 G/DL (ref 3.5–5)
ALP SERPL-CCNC: 105 U/L (ref 46–116)
ALT SERPL W P-5'-P-CCNC: 25 U/L (ref 12–78)
ANION GAP SERPL CALCULATED.3IONS-SCNC: 7 MMOL/L (ref 4–13)
AST SERPL W P-5'-P-CCNC: 15 U/L (ref 5–45)
BILIRUB SERPL-MCNC: 0.4 MG/DL (ref 0.2–1)
BUN SERPL-MCNC: 6 MG/DL (ref 5–25)
CALCIUM SERPL-MCNC: 8.7 MG/DL (ref 8.3–10.1)
CHLORIDE SERPL-SCNC: 104 MMOL/L (ref 100–108)
CHOLEST SERPL-MCNC: 181 MG/DL (ref 50–200)
CO2 SERPL-SCNC: 28 MMOL/L (ref 21–32)
CREAT SERPL-MCNC: 0.68 MG/DL (ref 0.6–1.3)
CREAT UR-MCNC: 159 MG/DL
ERYTHROCYTE [DISTWIDTH] IN BLOOD BY AUTOMATED COUNT: 13.6 % (ref 11.6–15.1)
EST. AVERAGE GLUCOSE BLD GHB EST-MCNC: 146 MG/DL
GFR SERPL CREATININE-BSD FRML MDRD: 106 ML/MIN/1.73SQ M
GLUCOSE P FAST SERPL-MCNC: 118 MG/DL (ref 65–99)
HBA1C MFR BLD: 6.7 % (ref 4.2–6.3)
HCT VFR BLD AUTO: 36.6 % (ref 34.8–46.1)
HDLC SERPL-MCNC: 44 MG/DL (ref 40–60)
HGB BLD-MCNC: 11.4 G/DL (ref 11.5–15.4)
LDLC SERPL CALC-MCNC: 104 MG/DL (ref 0–100)
MCH RBC QN AUTO: 25.9 PG (ref 26.8–34.3)
MCHC RBC AUTO-ENTMCNC: 31.1 G/DL (ref 31.4–37.4)
MCV RBC AUTO: 83 FL (ref 82–98)
MICROALBUMIN UR-MCNC: 30.2 MG/L (ref 0–20)
MICROALBUMIN/CREAT 24H UR: 19 MG/G CREATININE (ref 0–30)
NONHDLC SERPL-MCNC: 137 MG/DL
PLATELET # BLD AUTO: 242 THOUSANDS/UL (ref 149–390)
PMV BLD AUTO: 9.9 FL (ref 8.9–12.7)
POTASSIUM SERPL-SCNC: 3.7 MMOL/L (ref 3.5–5.3)
PROT SERPL-MCNC: 7 G/DL (ref 6.4–8.2)
RBC # BLD AUTO: 4.41 MILLION/UL (ref 3.81–5.12)
SODIUM SERPL-SCNC: 139 MMOL/L (ref 136–145)
TRIGL SERPL-MCNC: 164 MG/DL
TSH SERPL DL<=0.05 MIU/L-ACNC: 2.29 UIU/ML (ref 0.36–3.74)
WBC # BLD AUTO: 6.81 THOUSAND/UL (ref 4.31–10.16)

## 2018-08-07 PROCEDURE — 83036 HEMOGLOBIN GLYCOSYLATED A1C: CPT

## 2018-08-07 PROCEDURE — 36415 COLL VENOUS BLD VENIPUNCTURE: CPT

## 2018-08-07 PROCEDURE — 3061F NEG MICROALBUMINURIA REV: CPT | Performed by: FAMILY MEDICINE

## 2018-08-07 PROCEDURE — 82043 UR ALBUMIN QUANTITATIVE: CPT

## 2018-08-07 PROCEDURE — 82570 ASSAY OF URINE CREATININE: CPT

## 2018-08-07 PROCEDURE — 80061 LIPID PANEL: CPT

## 2018-08-07 PROCEDURE — 84443 ASSAY THYROID STIM HORMONE: CPT

## 2018-08-07 PROCEDURE — 80053 COMPREHEN METABOLIC PANEL: CPT

## 2018-08-07 PROCEDURE — 85027 COMPLETE CBC AUTOMATED: CPT

## 2018-08-24 RX ORDER — FLUTICASONE PROPIONATE 50 MCG
2 SPRAY, SUSPENSION (ML) NASAL DAILY
COMMUNITY
Start: 2014-06-30 | End: 2018-08-27 | Stop reason: SDUPTHER

## 2018-08-24 RX ORDER — CYCLOBENZAPRINE HCL 5 MG
1 TABLET ORAL
COMMUNITY
Start: 2017-11-20 | End: 2018-08-27 | Stop reason: ALTCHOICE

## 2018-08-24 RX ORDER — ATORVASTATIN CALCIUM 10 MG/1
1 TABLET, FILM COATED ORAL
COMMUNITY
Start: 2012-08-15 | End: 2018-08-27 | Stop reason: SDUPTHER

## 2018-08-27 ENCOUNTER — OFFICE VISIT (OUTPATIENT)
Dept: FAMILY MEDICINE CLINIC | Facility: CLINIC | Age: 43
End: 2018-08-27
Payer: COMMERCIAL

## 2018-08-27 VITALS
HEIGHT: 66 IN | RESPIRATION RATE: 16 BRPM | BODY MASS INDEX: 31.55 KG/M2 | SYSTOLIC BLOOD PRESSURE: 140 MMHG | DIASTOLIC BLOOD PRESSURE: 72 MMHG | OXYGEN SATURATION: 97 % | WEIGHT: 196.3 LBS | HEART RATE: 79 BPM

## 2018-08-27 DIAGNOSIS — E78.2 MIXED HYPERLIPIDEMIA: Primary | ICD-10-CM

## 2018-08-27 DIAGNOSIS — K21.9 GASTROESOPHAGEAL REFLUX DISEASE WITHOUT ESOPHAGITIS: ICD-10-CM

## 2018-08-27 DIAGNOSIS — E11.9 TYPE 2 DIABETES MELLITUS WITHOUT COMPLICATION, WITHOUT LONG-TERM CURRENT USE OF INSULIN (HCC): ICD-10-CM

## 2018-08-27 DIAGNOSIS — K21.9 GERD WITHOUT ESOPHAGITIS: ICD-10-CM

## 2018-08-27 DIAGNOSIS — L98.7 LOOSE SKIN: ICD-10-CM

## 2018-08-27 DIAGNOSIS — J30.89 NON-SEASONAL ALLERGIC RHINITIS, UNSPECIFIED TRIGGER: ICD-10-CM

## 2018-08-27 DIAGNOSIS — D64.9 ANEMIA, UNSPECIFIED TYPE: ICD-10-CM

## 2018-08-27 PROBLEM — B02.9 SHINGLES RASH: Status: RESOLVED | Noted: 2017-09-07 | Resolved: 2018-08-27

## 2018-08-27 PROCEDURE — 3008F BODY MASS INDEX DOCD: CPT | Performed by: FAMILY MEDICINE

## 2018-08-27 PROCEDURE — 99214 OFFICE O/P EST MOD 30 MIN: CPT | Performed by: FAMILY MEDICINE

## 2018-08-27 RX ORDER — OMEPRAZOLE 20 MG/1
20 CAPSULE, DELAYED RELEASE ORAL DAILY
Qty: 90 CAPSULE | Refills: 1 | Status: SHIPPED | OUTPATIENT
Start: 2018-08-27 | End: 2018-12-03 | Stop reason: SDUPTHER

## 2018-08-27 RX ORDER — FERROUS SULFATE TAB EC 324 MG (65 MG FE EQUIVALENT) 324 (65 FE) MG
324 TABLET DELAYED RESPONSE ORAL
Qty: 90 TABLET | Refills: 0 | Status: SHIPPED | OUTPATIENT
Start: 2018-08-27 | End: 2018-09-06 | Stop reason: SDUPTHER

## 2018-08-27 RX ORDER — MONTELUKAST SODIUM 10 MG/1
10 TABLET ORAL
Qty: 90 TABLET | Refills: 1 | Status: SHIPPED | OUTPATIENT
Start: 2018-08-27 | End: 2018-12-03 | Stop reason: SDUPTHER

## 2018-08-27 RX ORDER — FLUTICASONE PROPIONATE 50 MCG
2 SPRAY, SUSPENSION (ML) NASAL DAILY
Qty: 16 G | Refills: 1 | Status: SHIPPED | OUTPATIENT
Start: 2018-08-27 | End: 2018-12-03 | Stop reason: SDUPTHER

## 2018-08-27 RX ORDER — ATORVASTATIN CALCIUM 10 MG/1
10 TABLET, FILM COATED ORAL DAILY
Qty: 90 TABLET | Refills: 1 | Status: SHIPPED | OUTPATIENT
Start: 2018-08-27 | End: 2018-12-03 | Stop reason: SDUPTHER

## 2018-08-27 NOTE — PROGRESS NOTES
Assessment/Plan:    Problem List Items Addressed This Visit        Digestive    GERD without esophagitis     She is unable to stop medicine as she gets her symptoms back of acid reflux, so she will continue low dose of omeprazole         Relevant Medications    omeprazole (PriLOSEC) 20 mg delayed release capsule       Endocrine    Type 2 diabetes mellitus ([facility] )     Lab Results   Component Value Date    HGBA1C 6 7 (H) 08/07/2018       No results for input(s): POCGLU in the last 72 hours  Blood Sugar Average: Last 72 hrs:   diabetes is under control but A1c is slightly higher than last time she needs to improve her diet further and do regular exercise and continue same medication         Relevant Medications    sitaGLIPtin-metFORMIN (JANUMET)  MG per tablet    Other Relevant Orders    CBC and differential    Comprehensive metabolic panel    Hemoglobin A1C    Microalbumin / creatinine urine ratio       Respiratory    Non-seasonal allergic rhinitis    Relevant Medications    fluticasone (FLONASE) 50 mcg/act nasal spray    montelukast (SINGULAIR) 10 mg tablet       Other    Hyperlipidemia - Primary     Lab Results   Component Value Date    LDLCALC 104 (H) 08/07/2018     Continue low-fat diet exercise and she is on atorvastatin 10 mg, continue same         Relevant Medications    atorvastatin (LIPITOR) 10 mg tablet    Other Relevant Orders    Lipid panel    Loose skin     Low skin underneath both eyes which is getting worse, discussed with her to see a plastic surgeon         Relevant Orders    Ambulatory referral to Plastic Surgery    Anemia     Mild anemia, she is on vegetarian diet, discussed with her to take regular multivitamin and she will start iron sulfate daily         Relevant Medications    ferrous sulfate 324 (65 Fe) mg          Chief Complaint   Patient presents with    Follow-up     labs       Subjective:   Patient ID: Frantz Brooks is a 37 y o  female      She is here follow-up on her labs, she has diabetes and on medication, she tries to eat healthy low-fat low carb diet she does regular activities she had been traveling a lot in last few months now she came back and she wants to stay here for long time  She says she had blood test in [facility] and it was showing slight anemia, at that time he has no blood loss from anywhere,, she is vegetarian   She has GERD and she says if she tried to stop the medicine her symptoms come back so she cannot stop the medication, she is on omeprazole low dose        Review of Systems   Constitutional: Negative for activity change, appetite change, chills, diaphoresis, fatigue, fever and unexpected weight change  HENT: Negative for congestion, dental problem, ear discharge, ear pain, facial swelling, hearing loss, mouth sores, nosebleeds, postnasal drip, rhinorrhea, sinus pain, sinus pressure, sneezing, sore throat, trouble swallowing and voice change  Eyes: Negative for photophobia, pain, discharge, redness and itching  Respiratory: Negative for cough, chest tightness, shortness of breath and wheezing  Cardiovascular: Negative for chest pain, palpitations and leg swelling  Gastrointestinal: Negative for abdominal distention, abdominal pain, blood in stool, constipation, diarrhea and nausea  Endocrine: Negative for cold intolerance, heat intolerance, polydipsia, polyphagia and polyuria  Genitourinary: Negative for dysuria, flank pain, frequency, hematuria and urgency  Musculoskeletal: Negative for arthralgias, back pain, myalgias and neck pain  Skin: Negative for color change and pallor  Bags of puffiness below eyes b/l   Allergic/Immunologic: Negative for environmental allergies and food allergies  Neurological: Negative for dizziness, weakness, light-headedness, numbness and headaches  Hematological: Negative for adenopathy  Does not bruise/bleed easily     Psychiatric/Behavioral: Negative for behavioral problems, sleep disturbance and suicidal ideas  The patient is not nervous/anxious  Objective:  Physical Exam   Constitutional: She is oriented to person, place, and time  She appears well-developed and well-nourished  HENT:   Head: Normocephalic and atraumatic  Right Ear: External ear normal    Left Ear: External ear normal    Nose: Nose normal    Mouth/Throat: Oropharynx is clear and moist  No oropharyngeal exudate  Eyes: Conjunctivae and EOM are normal  Pupils are equal, round, and reactive to light  Right eye exhibits no discharge  Left eye exhibits no discharge  No scleral icterus  Neck: Normal range of motion  Neck supple  No tracheal deviation present  No thyromegaly present  Cardiovascular: Normal rate, regular rhythm and normal heart sounds  No murmur heard  Pulmonary/Chest: Effort normal and breath sounds normal  No respiratory distress  She has no wheezes  She has no rales  Abdominal: Soft  Bowel sounds are normal  She exhibits no distension and no mass  There is no tenderness  There is no rebound  Musculoskeletal: Normal range of motion  She exhibits no edema  Lymphadenopathy:     She has no cervical adenopathy  Neurological: She is alert and oriented to person, place, and time  She has normal reflexes  No cranial nerve deficit  Skin: Skin is warm  No rash noted  No erythema  No pallor  Bags of loose skin under both eyes    Psychiatric: She has a normal mood and affect  Her behavior is normal  Judgment and thought content normal    Nursing note and vitals reviewed           Past Surgical History:   Procedure Laterality Date    APPENDECTOMY      TOTAL ABDOMINAL HYSTERECTOMY         Family History   Problem Relation Age of Onset    Diabetes Mother     Hyperlipidemia Father     No Known Problems Son          Current Outpatient Prescriptions:     atorvastatin (LIPITOR) 10 mg tablet, Take 1 tablet (10 mg total) by mouth daily, Disp: 90 tablet, Rfl: 1    fluticasone (FLONASE) 50 mcg/act nasal spray, 2 sprays into each nostril daily, Disp: 16 g, Rfl: 1    glucose blood (ACCU-CHEK ACTIVE STRIPS) test strip, 1 Squirt by In Vitro route 2 (two) times a day, Disp: , Rfl:     montelukast (SINGULAIR) 10 mg tablet, Take 1 tablet (10 mg total) by mouth daily at bedtime, Disp: 90 tablet, Rfl: 1    omeprazole (PriLOSEC) 20 mg delayed release capsule, Take 1 capsule (20 mg total) by mouth daily, Disp: 90 capsule, Rfl: 1    ONE TOUCH LANCETS MISC, OneTouch Lancets Miscellaneous check blood sugar 1-2 times daily , Dx 250  Quantity: 1;  Refills: 5   Judy M D , [place] ;  Start 8-July-2014 Active 200 Miscellaneous Box, Disp: , Rfl:     sitaGLIPtin-metFORMIN (JANUMET)  MG per tablet, Take 1 tablet by mouth daily, Disp: 90 tablet, Rfl: 1    ferrous sulfate 324 (65 Fe) mg, Take 1 tablet (324 mg total) by mouth daily before breakfast, Disp: 90 tablet, Rfl: 0    Allergies   Allergen Reactions    Acetaminophen-Codeine      Reaction Date: 28Sep2007; TINGLING    Latex Rash       Vitals:    08/27/18 1112   BP: 140/72   Pulse: 79   Resp: 16   SpO2: 97%   Weight: 89 kg (196 lb 4 8 oz)   Height: 5' 6" (1 676 m)

## 2018-08-28 NOTE — ASSESSMENT & PLAN NOTE
Lab Results   Component Value Date    HGBA1C 6 7 (H) 08/07/2018       No results for input(s): POCGLU in the last 72 hours      Blood Sugar Average: Last 72 hrs:   diabetes is under control but A1c is slightly higher than last time she needs to improve her diet further and do regular exercise and continue same medication

## 2018-08-28 NOTE — ASSESSMENT & PLAN NOTE
She is unable to stop medicine as she gets her symptoms back of acid reflux, so she will continue low dose of omeprazole

## 2018-08-28 NOTE — ASSESSMENT & PLAN NOTE
Lab Results   Component Value Date    LDLCALC 104 (H) 08/07/2018     Continue low-fat diet exercise and she is on atorvastatin 10 mg, continue same

## 2018-08-28 NOTE — ASSESSMENT & PLAN NOTE
Mild anemia, she is on vegetarian diet, discussed with her to take regular multivitamin and she will start iron sulfate daily

## 2018-09-06 ENCOUNTER — TELEPHONE (OUTPATIENT)
Dept: FAMILY MEDICINE CLINIC | Facility: CLINIC | Age: 43
End: 2018-09-06

## 2018-09-06 DIAGNOSIS — D64.9 ANEMIA, UNSPECIFIED TYPE: ICD-10-CM

## 2018-09-06 RX ORDER — FERROUS SULFATE TAB EC 324 MG (65 MG FE EQUIVALENT) 324 (65 FE) MG
324 TABLET DELAYED RESPONSE ORAL
Qty: 90 TABLET | Refills: 1 | Status: SHIPPED | OUTPATIENT
Start: 2018-09-06 | End: 2018-12-03 | Stop reason: ALTCHOICE

## 2018-09-06 RX ORDER — FERROUS SULFATE TAB EC 324 MG (65 MG FE EQUIVALENT) 324 (65 FE) MG
324 TABLET DELAYED RESPONSE ORAL
Qty: 90 TABLET | Refills: 0 | Status: CANCELLED | OUTPATIENT
Start: 2018-09-06

## 2018-09-06 NOTE — TELEPHONE ENCOUNTER
Patient called in and said she got a notice from her mail order pharmacy that they cannot fill her ferrous sulfate tablets  She is requesting we resubmit these to her local [place] [id number]

## 2018-10-03 ENCOUNTER — ANNUAL EXAM (OUTPATIENT)
Dept: FAMILY MEDICINE CLINIC | Facility: CLINIC | Age: 43
End: 2018-10-03
Payer: COMMERCIAL

## 2018-10-03 VITALS
BODY MASS INDEX: 30.86 KG/M2 | RESPIRATION RATE: 16 BRPM | HEIGHT: 66 IN | WEIGHT: 192 LBS | SYSTOLIC BLOOD PRESSURE: 132 MMHG | DIASTOLIC BLOOD PRESSURE: 72 MMHG | HEART RATE: 82 BPM | OXYGEN SATURATION: 98 %

## 2018-10-03 DIAGNOSIS — Z01.419 ENCOUNTER FOR ANNUAL ROUTINE GYNECOLOGICAL EXAMINATION: Primary | ICD-10-CM

## 2018-10-03 DIAGNOSIS — R10.2 PELVIC PAIN IN FEMALE: ICD-10-CM

## 2018-10-03 DIAGNOSIS — N64.4 BREAST PAIN, RIGHT: ICD-10-CM

## 2018-10-03 DIAGNOSIS — Z23 NEED FOR INFLUENZA VACCINATION: ICD-10-CM

## 2018-10-03 PROCEDURE — 90686 IIV4 VACC NO PRSV 0.5 ML IM: CPT

## 2018-10-03 PROCEDURE — 90471 IMMUNIZATION ADMIN: CPT

## 2018-10-03 PROCEDURE — 99396 PREV VISIT EST AGE 40-64: CPT | Performed by: FAMILY MEDICINE

## 2018-10-03 NOTE — ASSESSMENT & PLAN NOTE
Gyne exam done, she has no cervix and she had hysterectomy for benign reasons, she does not need Pap smear, she has tenderness in the pelvic area, will do pelvic ultrasound and she has pain in the right breast and it looks more nodular, will do diagnostic mammogram and ultrasound of the breast rt

## 2018-10-03 NOTE — PROGRESS NOTES
Assessment/Plan:    Problem List Items Addressed This Visit        Other    Encounter for annual routine gynecological examination - Primary     Gyne exam done, she has no cervix and she had hysterectomy for benign reasons, she does not need Pap smear, she has tenderness in the pelvic area, will do pelvic ultrasound and she has pain in the right breast and it looks more nodular, will do diagnostic mammogram and ultrasound of the breast rt          Need for influenza vaccination     Influenza vaccine given         Relevant Orders    SYRINGE/SINGLE-DOSE VIAL: influenza vaccine, [id number], quadrivalent, 0 5 mL, preservative-free, for patients 3+ yr (FLUZONE) (Completed)    Breast pain, right    Relevant Orders    Mammo diagnostic right w cad    US breast right limited (diagnostic)    Pelvic pain in female    Relevant Orders    US pelvis complete non OB          Chief Complaint   Patient presents with    Gynecologic Exam       Subjective:   Patient ID: Jam Calix is a 37 y o  female  She is here for Gyne exam, she had robotic abdominal hysterectomy for benign reasons, she had endometriosis, she says her ovaries are still intact  She complains of right breast pain on and off and it shoots from her breast to the back, her last mammogram was done in December 2017 which was normal,      Gynecologic Exam   The patient's primary symptoms include pelvic pain  Pertinent negatives include no abdominal pain, back pain, chills, constipation, diarrhea, dysuria, fever, flank pain, frequency, headaches, hematuria, nausea, sore throat or urgency  Review of Systems   Constitutional: Negative for activity change, appetite change, chills, diaphoresis, fatigue, fever and unexpected weight change     HENT: Negative for congestion, dental problem, ear discharge, ear pain, facial swelling, hearing loss, mouth sores, nosebleeds, postnasal drip, rhinorrhea, sinus pain, sinus pressure, sneezing, sore throat, trouble swallowing and voice change  Eyes: Negative for photophobia, pain, discharge, redness and itching  Respiratory: Negative for cough, chest tightness, shortness of breath and wheezing  Cardiovascular: Negative for chest pain, palpitations and leg swelling  Gastrointestinal: Negative for abdominal distention, abdominal pain, blood in stool, constipation, diarrhea and nausea  Endocrine: Negative for cold intolerance, heat intolerance, polydipsia, polyphagia and polyuria  Genitourinary: Positive for pelvic pain  Negative for dysuria, flank pain, frequency, hematuria and urgency  Musculoskeletal: Negative for arthralgias, back pain, myalgias and neck pain  Skin: Negative for color change and pallor  Allergic/Immunologic: Negative for environmental allergies and food allergies  Neurological: Negative for dizziness, weakness, light-headedness, numbness and headaches  Hematological: Negative for adenopathy  Does not bruise/bleed easily  Psychiatric/Behavioral: Negative for behavioral problems, sleep disturbance and suicidal ideas  The patient is not nervous/anxious  Objective:  Physical Exam   Constitutional: She is oriented to person, place, and time  She appears well-developed and well-nourished  HENT:   Head: Normocephalic and atraumatic  Mouth/Throat: No oropharyngeal exudate  Eyes: Conjunctivae and EOM are normal  Right eye exhibits no discharge  Left eye exhibits no discharge  No scleral icterus  Neck: Normal range of motion  Neck supple  No tracheal deviation present  No thyromegaly present  Cardiovascular: Normal rate, regular rhythm and normal heart sounds  No murmur heard  Pulmonary/Chest: Effort normal and breath sounds normal  No respiratory distress  She has no wheezes  She has no rales  Right breast feels more glandular and tender   Abdominal: Soft  Bowel sounds are normal  She exhibits no distension and no mass  There is no tenderness  There is no rebound  Genitourinary: Vagina normal  No vaginal discharge found  Genitourinary Comments: Uterus absent, no cervix  She feels tenderness in the pelvic area bilaterally but there is no mass palpable   Musculoskeletal: Normal range of motion  She exhibits no edema  Lymphadenopathy:     She has no cervical adenopathy  Right axillary: No pectoral and no lateral adenopathy present  Left axillary: No pectoral and no lateral adenopathy present  Right: No supraclavicular adenopathy present  Left: No supraclavicular adenopathy present  Neurological: She is alert and oriented to person, place, and time  She has normal reflexes  No cranial nerve deficit  Skin: Skin is warm  No rash noted  No erythema  No pallor  Psychiatric: She has a normal mood and affect  Her behavior is normal  Judgment and thought content normal    Nursing note and vitals reviewed           Past Surgical History:   Procedure Laterality Date    APPENDECTOMY      TOTAL ABDOMINAL HYSTERECTOMY         Family History   Problem Relation Age of Onset    Diabetes Mother     Hyperlipidemia Father     No Known Problems Son          Current Outpatient Prescriptions:     atorvastatin (LIPITOR) 10 mg tablet, Take 1 tablet (10 mg total) by mouth daily, Disp: 90 tablet, Rfl: 1    ferrous sulfate 324 (65 Fe) mg, Take 1 tablet (324 mg total) by mouth daily before breakfast, Disp: 90 tablet, Rfl: 1    fluticasone (FLONASE) 50 mcg/act nasal spray, 2 sprays into each nostril daily, Disp: 16 g, Rfl: 1    glucose blood (ACCU-CHEK ACTIVE STRIPS) test strip, 1 Squirt by In Vitro route 2 (two) times a day, Disp: , Rfl:     montelukast (SINGULAIR) 10 mg tablet, Take 1 tablet (10 mg total) by mouth daily at bedtime, Disp: 90 tablet, Rfl: 1    omeprazole (PriLOSEC) 20 mg delayed release capsule, Take 1 capsule (20 mg total) by mouth daily, Disp: 90 capsule, Rfl: 1    ONE TOUCH LANCETS MISC, OneTouch Lancets Miscellaneous check blood sugar 1-2 times daily , Dx 250  Quantity: 1;  Refills: Yamilex JUAN Oz[place] ;  Start 8-July-2014 Active 200 Miscellaneous Box, Disp: , Rfl:     sitaGLIPtin-metFORMIN (JANUMET)  MG per tablet, Take 1 tablet by mouth daily, Disp: 90 tablet, Rfl: 1    Allergies   Allergen Reactions    Acetaminophen-Codeine      Reaction Date: [id number]; TINGLING    Latex Rash       Vitals:    10/03/18 1031   BP: 132/72   Pulse: 82   Resp: 16   SpO2: 98%   Weight: 87 1 kg (192 lb)   Height: 5' 6" (1 676 m)

## 2018-10-09 ENCOUNTER — HOSPITAL ENCOUNTER (OUTPATIENT)
Dept: ULTRASOUND IMAGING | Facility: HOSPITAL | Age: 43
Discharge: HOME | End: 2018-10-09
Attending: FAMILY MEDICINE
Payer: COMMERCIAL

## 2018-10-09 DIAGNOSIS — R10.2 PELVIC PAIN IN FEMALE: ICD-10-CM

## 2018-10-09 DIAGNOSIS — N83.8 OVARIAN MASS, LEFT: Primary | ICD-10-CM

## 2018-10-09 PROCEDURE — 76830 TRANSVAGINAL US NON-OB: CPT

## 2018-10-09 PROCEDURE — 76856 US EXAM PELVIC COMPLETE: CPT

## 2018-10-09 NOTE — PROGRESS NOTES
Spoke with the patient regarding the result of pelvic ultrasound and she has history of endometriosis in the past, I will order MRI of the pelvis and then follow up

## 2018-10-11 ENCOUNTER — HOSPITAL ENCOUNTER (OUTPATIENT)
Dept: ULTRASOUND IMAGING | Facility: CLINIC | Age: 43
Discharge: HOME | End: 2018-10-11
Payer: COMMERCIAL

## 2018-10-11 ENCOUNTER — HOSPITAL ENCOUNTER (OUTPATIENT)
Dept: MAMMOGRAPHY | Facility: CLINIC | Age: 43
Discharge: HOME | End: 2018-10-11
Payer: COMMERCIAL

## 2018-10-11 DIAGNOSIS — N64.4 BREAST PAIN, RIGHT: ICD-10-CM

## 2018-10-11 PROCEDURE — G0279 TOMOSYNTHESIS, MAMMO: HCPCS

## 2018-10-11 PROCEDURE — 76642 ULTRASOUND BREAST LIMITED: CPT

## 2018-10-11 PROCEDURE — 77065 DX MAMMO INCL CAD UNI: CPT

## 2018-10-12 ENCOUNTER — TELEPHONE (OUTPATIENT)
Dept: FAMILY MEDICINE CLINIC | Facility: CLINIC | Age: 43
End: 2018-10-12

## 2018-10-12 NOTE — TELEPHONE ENCOUNTER
Patient has MRI pelvis scheduled for 10/17/18  Is it okay for her to wait until that date to have her MRI based on her diagnosis for the MRI? She would like to have it done much earlier but, 10/17/18 is the soonest date she could have  She wants it done earlier so she can have a piece of mind  She would like a call back to discuss this matter      (Patient is aware Dr Jerzy Zurita is not in the office today )

## 2018-10-14 NOTE — TELEPHONE ENCOUNTER
She has appointment on October 17 which is only 3 days from now, and that is appropriate, please inform the patient

## 2018-10-16 LAB
LEFT EYE DIABETIC RETINOPATHY: NORMAL
RIGHT EYE DIABETIC RETINOPATHY: NORMAL

## 2018-10-17 ENCOUNTER — HOSPITAL ENCOUNTER (OUTPATIENT)
Dept: MRI IMAGING | Facility: HOSPITAL | Age: 43
Discharge: HOME | End: 2018-10-17
Payer: COMMERCIAL

## 2018-10-17 DIAGNOSIS — N83.8 OVARIAN MASS, LEFT: ICD-10-CM

## 2018-10-17 PROCEDURE — A9585 GADOBUTROL INJECTION: HCPCS | Performed by: FAMILY MEDICINE

## 2018-10-17 PROCEDURE — 72197 MRI PELVIS W/O & W/DYE: CPT

## 2018-10-17 RX ADMIN — GADOBUTROL 9 ML: 604.72 INJECTION INTRAVENOUS at 16:17

## 2018-10-18 ENCOUNTER — OFFICE VISIT (OUTPATIENT)
Dept: FAMILY MEDICINE CLINIC | Facility: CLINIC | Age: 43
End: 2018-10-18
Payer: COMMERCIAL

## 2018-10-18 ENCOUNTER — HOSPITAL ENCOUNTER (EMERGENCY)
Facility: HOSPITAL | Age: 43
Discharge: HOME | End: 2018-10-18
Attending: EMERGENCY MEDICINE | Admitting: EMERGENCY MEDICINE
Payer: COMMERCIAL

## 2018-10-18 ENCOUNTER — TELEPHONE (OUTPATIENT)
Dept: FAMILY MEDICINE CLINIC | Facility: CLINIC | Age: 43
End: 2018-10-18

## 2018-10-18 ENCOUNTER — APPOINTMENT (EMERGENCY)
Dept: CT IMAGING | Facility: HOSPITAL | Age: 43
End: 2018-10-18
Payer: COMMERCIAL

## 2018-10-18 VITALS
RESPIRATION RATE: 20 BRPM | HEART RATE: 66 BPM | OXYGEN SATURATION: 99 % | DIASTOLIC BLOOD PRESSURE: 83 MMHG | SYSTOLIC BLOOD PRESSURE: 145 MMHG | TEMPERATURE: 98.4 F

## 2018-10-18 VITALS
HEIGHT: 66 IN | RESPIRATION RATE: 18 BRPM | DIASTOLIC BLOOD PRESSURE: 82 MMHG | BODY MASS INDEX: 31.18 KG/M2 | TEMPERATURE: 97.9 F | HEART RATE: 100 BPM | OXYGEN SATURATION: 99 % | SYSTOLIC BLOOD PRESSURE: 142 MMHG | WEIGHT: 194 LBS

## 2018-10-18 DIAGNOSIS — N83.299 COMPLEX OVARIAN CYST: ICD-10-CM

## 2018-10-18 DIAGNOSIS — R10.9 ABDOMINAL PAIN: Primary | ICD-10-CM

## 2018-10-18 DIAGNOSIS — R10.9 ACUTE ABDOMINAL PAIN: Primary | ICD-10-CM

## 2018-10-18 DIAGNOSIS — N83.209 OVARIAN CYST: ICD-10-CM

## 2018-10-18 LAB
ALBUMIN SERPL BCP-MCNC: 3.3 G/DL (ref 3.5–5)
ALP SERPL-CCNC: 105 U/L (ref 46–116)
ALT SERPL W P-5'-P-CCNC: 30 U/L (ref 12–78)
ANION GAP SERPL CALCULATED.3IONS-SCNC: 7 MMOL/L (ref 4–13)
AST SERPL W P-5'-P-CCNC: 10 U/L (ref 5–45)
BASOPHILS # BLD AUTO: 0.02 THOUSANDS/ΜL (ref 0–0.1)
BASOPHILS NFR BLD AUTO: 0 % (ref 0–1)
BILIRUB SERPL-MCNC: 0.3 MG/DL (ref 0.2–1)
BILIRUB UR QL STRIP: NEGATIVE
BUN SERPL-MCNC: 9 MG/DL (ref 5–25)
CALCIUM SERPL-MCNC: 8.8 MG/DL (ref 8.3–10.1)
CHLORIDE SERPL-SCNC: 104 MMOL/L (ref 100–108)
CLARITY UR: CLEAR
CO2 SERPL-SCNC: 27 MMOL/L (ref 21–32)
COLOR UR: YELLOW
CREAT SERPL-MCNC: 0.67 MG/DL (ref 0.6–1.3)
EOSINOPHIL # BLD AUTO: 0.08 THOUSAND/ΜL (ref 0–0.61)
EOSINOPHIL NFR BLD AUTO: 1 % (ref 0–6)
ERYTHROCYTE [DISTWIDTH] IN BLOOD BY AUTOMATED COUNT: 13 % (ref 11.6–15.1)
EXT PREG TEST URINE: NEGATIVE
GFR SERPL CREATININE-BSD FRML MDRD: 106 ML/MIN/1.73SQ M
GLUCOSE SERPL-MCNC: 125 MG/DL (ref 65–140)
GLUCOSE UR STRIP-MCNC: NEGATIVE MG/DL
HCT VFR BLD AUTO: 34.8 % (ref 34.8–46.1)
HGB BLD-MCNC: 10.9 G/DL (ref 11.5–15.4)
HGB UR QL STRIP.AUTO: NEGATIVE
IMM GRANULOCYTES # BLD AUTO: 0.02 THOUSAND/UL (ref 0–0.2)
IMM GRANULOCYTES NFR BLD AUTO: 0 % (ref 0–2)
KETONES UR STRIP-MCNC: NEGATIVE MG/DL
LEUKOCYTE ESTERASE UR QL STRIP: NEGATIVE
LIPASE SERPL-CCNC: 103 U/L (ref 73–393)
LYMPHOCYTES # BLD AUTO: 2.08 THOUSANDS/ΜL (ref 0.6–4.47)
LYMPHOCYTES NFR BLD AUTO: 27 % (ref 14–44)
MCH RBC QN AUTO: 25.6 PG (ref 26.8–34.3)
MCHC RBC AUTO-ENTMCNC: 31.3 G/DL (ref 31.4–37.4)
MCV RBC AUTO: 82 FL (ref 82–98)
MONOCYTES # BLD AUTO: 0.34 THOUSAND/ΜL (ref 0.17–1.22)
MONOCYTES NFR BLD AUTO: 5 % (ref 4–12)
NEUTROPHILS # BLD AUTO: 5.09 THOUSANDS/ΜL (ref 1.85–7.62)
NEUTS SEG NFR BLD AUTO: 67 % (ref 43–75)
NITRITE UR QL STRIP: NEGATIVE
NRBC BLD AUTO-RTO: 0 /100 WBCS
PH UR STRIP.AUTO: 6.5 [PH] (ref 4.5–8)
PLATELET # BLD AUTO: 244 THOUSANDS/UL (ref 149–390)
PMV BLD AUTO: 9.7 FL (ref 8.9–12.7)
POTASSIUM SERPL-SCNC: 3.6 MMOL/L (ref 3.5–5.3)
PROT SERPL-MCNC: 6.9 G/DL (ref 6.4–8.2)
PROT UR STRIP-MCNC: NEGATIVE MG/DL
RBC # BLD AUTO: 4.25 MILLION/UL (ref 3.81–5.12)
SODIUM SERPL-SCNC: 138 MMOL/L (ref 136–145)
SP GR UR STRIP.AUTO: 1.01 (ref 1–1.03)
UROBILINOGEN UR QL STRIP.AUTO: 0.2 E.U./DL
WBC # BLD AUTO: 7.63 THOUSAND/UL (ref 4.31–10.16)

## 2018-10-18 PROCEDURE — 81003 URINALYSIS AUTO W/O SCOPE: CPT

## 2018-10-18 PROCEDURE — 99284 EMERGENCY DEPT VISIT MOD MDM: CPT

## 2018-10-18 PROCEDURE — 99214 OFFICE O/P EST MOD 30 MIN: CPT | Performed by: FAMILY MEDICINE

## 2018-10-18 PROCEDURE — 81025 URINE PREGNANCY TEST: CPT | Performed by: PHYSICIAN ASSISTANT

## 2018-10-18 PROCEDURE — 74177 CT ABD & PELVIS W/CONTRAST: CPT

## 2018-10-18 PROCEDURE — 80053 COMPREHEN METABOLIC PANEL: CPT | Performed by: PHYSICIAN ASSISTANT

## 2018-10-18 PROCEDURE — 36415 COLL VENOUS BLD VENIPUNCTURE: CPT | Performed by: PHYSICIAN ASSISTANT

## 2018-10-18 PROCEDURE — 96374 THER/PROPH/DIAG INJ IV PUSH: CPT

## 2018-10-18 PROCEDURE — 96361 HYDRATE IV INFUSION ADD-ON: CPT

## 2018-10-18 PROCEDURE — 85025 COMPLETE CBC W/AUTO DIFF WBC: CPT | Performed by: PHYSICIAN ASSISTANT

## 2018-10-18 PROCEDURE — 83690 ASSAY OF LIPASE: CPT | Performed by: PHYSICIAN ASSISTANT

## 2018-10-18 RX ORDER — KETOROLAC TROMETHAMINE 30 MG/ML
15 INJECTION, SOLUTION INTRAMUSCULAR; INTRAVENOUS ONCE
Status: COMPLETED | OUTPATIENT
Start: 2018-10-18 | End: 2018-10-18

## 2018-10-18 RX ORDER — FENTANYL CITRATE 50 UG/ML
50 INJECTION, SOLUTION INTRAMUSCULAR; INTRAVENOUS ONCE
Status: DISCONTINUED | OUTPATIENT
Start: 2018-10-18 | End: 2018-10-18

## 2018-10-18 RX ORDER — NAPROXEN 500 MG/1
500 TABLET ORAL 2 TIMES DAILY WITH MEALS
Qty: 14 TABLET | Refills: 0 | Status: SHIPPED | OUTPATIENT
Start: 2018-10-18 | End: 2018-12-03 | Stop reason: ALTCHOICE

## 2018-10-18 RX ORDER — SODIUM CHLORIDE 9 MG/ML
125 INJECTION, SOLUTION INTRAVENOUS CONTINUOUS
Status: DISCONTINUED | OUTPATIENT
Start: 2018-10-18 | End: 2018-10-18 | Stop reason: HOSPADM

## 2018-10-18 RX ADMIN — IOHEXOL 100 ML: 350 INJECTION, SOLUTION INTRAVENOUS at 15:00

## 2018-10-18 RX ADMIN — SODIUM CHLORIDE 125 ML/HR: 0.9 INJECTION, SOLUTION INTRAVENOUS at 13:21

## 2018-10-18 RX ADMIN — KETOROLAC TROMETHAMINE 15 MG: 30 INJECTION, SOLUTION INTRAMUSCULAR at 15:38

## 2018-10-18 NOTE — ASSESSMENT & PLAN NOTE
She presented with acute abdominal pain started this morning, she is being evaluated for a complex cyst in the left ovary and MRI result is pending, as she is very tender and pain is significant, advised to go to the ER there is possibility of rupture of the cyst, she needs further evaluation and I spoke to the ER physician and send the patient to [address] [place] [address]

## 2018-10-18 NOTE — TELEPHONE ENCOUNTER
Patient called in to get the results of her MRI   I let her know that they were not finalized yet  She would like a call then

## 2018-10-18 NOTE — ED PROVIDER NOTES
History  Chief Complaint   Patient presents with    Pelvic Pain     PT presents to ED with pelvic pain PT reports "a pricking an vibration of pain when I sit"  reports "The Dr suggested we come here for a CT scan"     This 67-year-old female presents emergency room with a history of an onset of lower abdominal pain this morning  She states the pain radiates to her rectum area in her perineal area  She has had increased pain with straining to move her bowels at this morning as well as with urination  She does complain of urgency but no frequency or, hematuria, dysuria  She denies any fever chills  She has had a normal appetite  She has been recently been worked up for endometriosis by her family physician  She had an ultrasound that demonstrated a complex cyst on her left ovary  There was a solid component to it as well  MRI was ordered which was done yesterday but the results are pending  He was ordered due to the fact that they wanted to see if it was an endometrioma verses a solid nodule mass  Past medical history is positive for GERD, hyperlipidemia, intervertebral disc disorder with radiculopathy, type 2 diabetes mellitus  Past surgical history includes appendectomy and total abdominal hysterectomy with right oophorectomy          History provided by:  Patient  Pelvic Pain   Location:  Suprapubic pain  Quality:  Cramping, prickling sensation  Severity:  Moderate  Onset quality:  Gradual  Duration:  4 hours  Timing:  Constant  Progression:  Waxing and waning  Chronicity:  New  Relieved by:  Nothing  Worsened by:  Sitting  Associated symptoms: abdominal pain    Associated symptoms: no chest pain, no congestion, no cough, no diarrhea, no ear pain, no fatigue, no fever, no headaches, no loss of consciousness, no myalgias, no nausea, no rash, no rhinorrhea, no shortness of breath, no sore throat, no vomiting and no wheezing    Abdominal pain:     Location:  Suprapubic    Quality: cramping Severity:  Moderate    Onset quality:  Gradual    Duration:  4 hours    Progression:  Waxing and waning    Chronicity:  New      Prior to Admission Medications   Prescriptions Last Dose Informant Patient Reported? Taking? ONE TOUCH LANCETS MISC   Yes No   Sig: OneTouch Lancets Miscellaneous  check blood sugar 1-2 times daily , Dx 250   Quantity: 1;  Refills: 5      Judyshelly JUAN Hank Obregon ;  Start   Active  200 Miscellaneous Box   atorvastatin (LIPITOR) 10 mg tablet   No No   Sig: Take 1 tablet (10 mg total) by mouth daily   ferrous sulfate 324 (65 Fe) mg   No No   Sig: Take 1 tablet (324 mg total) by mouth daily before breakfast   fluticasone (FLONASE) 50 mcg/act nasal spray   No No   Si sprays into each nostril daily   glucose blood (ACCU-CHEK ACTIVE STRIPS) test strip   Yes No   Si Squirt by In Vitro route 2 (two) times a day   montelukast (SINGULAIR) 10 mg tablet   No No   Sig: Take 1 tablet (10 mg total) by mouth daily at bedtime   omeprazole (PriLOSEC) 20 mg delayed release capsule   No No   Sig: Take 1 capsule (20 mg total) by mouth daily   sitaGLIPtin-metFORMIN (JANUMET)  MG per tablet   No No   Sig: Take 1 tablet by mouth daily      Facility-Administered Medications: None       Past Medical History:   Diagnosis Date    GERD (gastroesophageal reflux disease)     Hyperlipidemia     Intervertebral disc disorders with radiculopathy, lumbar region     Type 2 diabetes mellitus (HCC)        Past Surgical History:   Procedure Laterality Date    APPENDECTOMY      TOTAL ABDOMINAL HYSTERECTOMY         Family History   Problem Relation Age of Onset    Diabetes Mother     Hyperlipidemia Father     No Known Problems Son      I have reviewed and agree with the history as documented  Social History   Substance Use Topics    Smoking status: Never Smoker    Smokeless tobacco: Never Used    Alcohol use No        Review of Systems   Constitutional: Positive for activity change   Negative for appetite change, chills, diaphoresis, fatigue and fever  HENT: Negative for congestion, ear pain, mouth sores, rhinorrhea, sore throat and trouble swallowing  Eyes: Negative for pain, discharge, redness and itching  Respiratory: Negative for cough, shortness of breath and wheezing  Cardiovascular: Negative for chest pain  Gastrointestinal: Positive for abdominal pain  Negative for diarrhea, nausea and vomiting  Genitourinary: Positive for pelvic pain and urgency  Negative for dysuria and frequency  Musculoskeletal: Negative for back pain and myalgias  Skin: Negative for rash  Neurological: Negative for loss of consciousness and headaches  Psychiatric/Behavioral: Negative for confusion  All other systems reviewed and are negative  Physical Exam  Physical Exam   Constitutional: She is oriented to person, place, and time  She appears well-developed and well-nourished  She appears distressed  HENT:   Head: Normocephalic  Right Ear: External ear normal    Left Ear: External ear normal    Nose: Nose normal    Eyes: Conjunctivae are normal  Right eye exhibits no discharge  Left eye exhibits no discharge  Neck: Neck supple  Cardiovascular: Normal rate, regular rhythm and normal heart sounds  Exam reveals no gallop and no friction rub  No murmur heard  Pulmonary/Chest: Effort normal and breath sounds normal  No respiratory distress  She has no wheezes  She has no rales  Abdominal: Soft  There is tenderness  There is guarding  There is no rebound  Musculoskeletal: She exhibits no edema  Lymphadenopathy:     She has no cervical adenopathy  Neurological: She is alert and oriented to person, place, and time  Skin: Skin is warm  Capillary refill takes less than 2 seconds  She is not diaphoretic  Psychiatric: She has a normal mood and affect  Her behavior is normal  Judgment and thought content normal    Nursing note and vitals reviewed        Vital Signs  ED Triage Vitals Temperature Pulse Respirations Blood Pressure SpO2   10/18/18 1217 10/18/18 1217 10/18/18 1217 10/18/18 1245 10/18/18 1217   98 4 °F (36 9 °C) 76 18 130/68 97 %      Temp Source Heart Rate Source Patient Position - Orthostatic VS BP Location FiO2 (%)   10/18/18 1217 10/18/18 1217 10/18/18 1245 10/18/18 1245 --   Oral Monitor Sitting Right arm       Pain Score       10/18/18 1217       5           Vitals:    10/18/18 1217 10/18/18 1245 10/18/18 1544 10/18/18 1545   BP:  130/68 128/78 145/83   Pulse: 76  77 66   Patient Position - Orthostatic VS:  Sitting Sitting Lying       Visual Acuity  Visual Acuity      Most Recent Value   L Pupil Size (mm)  3   R Pupil Size (mm)  3          ED Medications  Medications   sodium chloride 0 9 % infusion (0 mL/hr Intravenous Stopped 10/18/18 1641)   ketorolac (TORADOL) injection 15 mg (15 mg Intravenous Given 10/18/18 1538)   iohexol (OMNIPAQUE) 350 MG/ML injection (MULTI-DOSE) 100 mL (100 mL Intravenous Given 10/18/18 1500)       Diagnostic Studies  Results Reviewed     Procedure Component Value Units Date/Time    Comprehensive metabolic panel [[id number]]  (Abnormal) Collected:  10/18/18 1319    Lab Status:  Final result Specimen:  Blood from Arm, Right Updated:  10/18/18 1356     Sodium 138 mmol/L      Potassium 3 6 mmol/L      Chloride 104 mmol/L      CO2 27 mmol/L      ANION GAP 7 mmol/L      BUN 9 mg/dL      Creatinine 0 67 mg/dL      Glucose 125 mg/dL      Calcium 8 8 mg/dL      AST 10 U/L      ALT 30 U/L      Alkaline Phosphatase 105 U/L      Total Protein 6 9 g/dL      Albumin 3 3 (L) g/dL      Total Bilirubin 0 30 mg/dL      eGFR 106 ml/min/1 73sq m     Narrative:         National Kidney Disease Education Program recommendations are as follows:  GFR calculation is accurate only with a steady state creatinine  Chronic Kidney disease less than 60 ml/min/1 73 sq  meters  Kidney failure less than 15 ml/min/1 73 sq  meters      Lipase [[id number]]  (Normal) Collected:  10/18/18 1319    Lab Status:  Final result Specimen:  Blood from Arm, Right Updated:  10/18/18 1356     Lipase 103 u/L     CBC and differential [[id number]]  (Abnormal) Collected:  10/18/18 1319    Lab Status:  Final result Specimen:  Blood from Arm, Right Updated:  10/18/18 1327     WBC 7 63 Thousand/uL      RBC 4 25 Million/uL      Hemoglobin 10 9 (L) g/dL      Hematocrit 34 8 %      MCV 82 fL      MCH 25 6 (L) pg      MCHC 31 3 (L) g/dL      RDW 13 0 %      MPV 9 7 fL      Platelets 858 Thousands/uL      nRBC 0 /100 WBCs      Neutrophils Relative 67 %      Immat GRANS % 0 %      Lymphocytes Relative 27 %      Monocytes Relative 5 %      Eosinophils Relative 1 %      Basophils Relative 0 %      Neutrophils Absolute 5 09 Thousands/µL      Immature Grans Absolute 0 02 Thousand/uL      Lymphocytes Absolute 2 08 Thousands/µL      Monocytes Absolute 0 34 Thousand/µL      Eosinophils Absolute 0 08 Thousand/µL      Basophils Absolute 0 02 Thousands/µL     POCT pregnancy, urine [[id number]]  (Normal) Resulted:  10/18/18 1300    Lab Status:  Final result Updated:  10/18/18 1300     EXT PREG TEST UR (Ref: Negative) negative    ED Urine Macroscopic [[id number]] Collected:  10/18/18 1313    Lab Status:  Final result Specimen:  Urine Updated:  10/18/18 1259     Color, UA Yellow     Clarity, UA Clear     pH, UA 6 5     Leukocytes, UA Negative     Nitrite, UA Negative     Protein, UA Negative mg/dl      Glucose, UA Negative mg/dl      Ketones, UA Negative mg/dl      Urobilinogen, UA 0 2 E U /dl      Bilirubin, UA Negative     Blood, UA Negative     Specific Gravity, UA 1 010    Narrative:       [facility] RESULT                 CT abdomen pelvis with contrast   ED Interpretation by Rosalie Alvarez PA-C (10/18 1623)   New small amount of free fluid in the pelvis, question interval rupture of left ovarian cyst   This small left ovarian cysts are not obviously changed since the recent MRI examination        Final Result by Annette Finn MD (10/18 1525)      1  New small amount free fluid in the pelvis, question interval rupture of a left ovarian cyst   The small left ovarian cysts are not obviously changed since the recent MRI examination  Workstation performed: [id number]                    Procedures  Procedures       Phone Contacts  ED Phone Contact    ED Course  ED Course as of Oct 18 1800   Thu Oct 18, 2018   8218 I spoke to patient regards to her Tylenol codeine allergy  She states that she is allergic to Tylenol but not codeine  She has received pain medications in the past with no difficulties  She normally takes anti-inflammatories as needed for any painful conditions  MDM  Number of Diagnoses or Management Options  Abdominal pain: new and requires workup  Ovarian cyst: new and requires workup     Amount and/or Complexity of Data Reviewed  Clinical lab tests: ordered and reviewed  Tests in the radiology section of CPT®: ordered and reviewed  Tests in the medicine section of CPT®: ordered and reviewed    Risk of Complications, Morbidity, and/or Mortality  Presenting problems: high  Diagnostic procedures: high  Management options: high  General comments: This patient presents emergency room for an acute onset of lower abdominal pain  She has a history of endometriosis and was recently diagnosed with a left ovarian cyst   She presented today because of increased pain in her suprapubic area  She was seen and examined  A CT scan was performed which demonstrated a ruptured left ovarian cyst   Laboratory studies were reviewed  Patient was medicated with Toradol and was feeling better  She was discharged home with a prescription for Naprosyn, 1 pill twice daily as needed for pain with food  She was discharged home and was instructed to follow up with her family physician for further evaluation  Should her symptoms worsen, she will return to the emergency room      Patient Progress  Patient progress: stable    CritCare Time    Disposition  Final diagnoses:   Abdominal pain   Ovarian cyst - Acute rupture of the left ovarian cyst     Time reflects when diagnosis was documented in both MDM as applicable and the Disposition within this note     Time User Action Codes Description Comment    10/18/2018  4:24 PM Blayne Gaston Add [R10 9] Abdominal pain     10/18/2018  4:24 PM Catrina Ernst [K75 490] Ovarian cyst     10/18/2018  4:24 PM Blayne Gaston Modify [N83 209] Ovarian cyst Acute rupture of the left ovarian cyst      ED Disposition     ED Disposition Condition Comment    Discharge  La Rodriguez discharge to home/self care      Condition at discharge: Good        Follow-up Information     Follow up With Specialties Details Why [address] [place] [address] Avenue, MD Family Medicine In 2 days  [address]  [address] [facility]  [phone]            Discharge Medication List as of 10/18/2018  4:26 PM      CONTINUE these medications which have NOT CHANGED    Details   atorvastatin (LIPITOR) 10 mg tablet Take 1 tablet (10 mg total) by mouth daily, Starting Mon 8/27/2018, Print      ferrous sulfate 324 (65 Fe) mg Take 1 tablet (324 mg total) by mouth daily before breakfast, Starting Thu 9/6/2018, Normal      fluticasone (FLONASE) 50 mcg/act nasal spray 2 sprays into each nostril daily, Starting Mon 8/27/2018, Print      glucose blood (ACCU-CHEK ACTIVE STRIPS) test strip 1 Squirt by In Vitro route 2 (two) times a day, Starting Tue 5/19/2015, Historical Med      montelukast (SINGULAIR) 10 mg tablet Take 1 tablet (10 mg total) by mouth daily at bedtime, Starting Mon 8/27/2018, Print      omeprazole (PriLOSEC) 20 mg delayed release capsule Take 1 capsule (20 mg total) by mouth daily, Starting Mon 8/27/2018, Print      ONE TOUCH LANCETS MISC OneTouch Lancets Miscellaneous  check blood sugar 1-2 times daily , Dx 250   Quantity: 1;  Refills: 5      Judy Liliane TREVIÑO ;  Start 8-July-2014  Active  200 Miscellaneous Box, Historical Med      sitaGLIPtin-metFORMIN (JANUMET)  MG per tablet Take 1 tablet by mouth daily, Starting Mon 8/27/2018, Print           No discharge procedures on file      ED Provider  Electronically Signed by           Stanton Miller PA-C  10/18/18 4314

## 2018-10-18 NOTE — DISCHARGE INSTRUCTIONS
Abdominal Pain, Ambulatory Care   GENERAL INFORMATION:   Abdominal pain  can be dull, achy, or sharp  You may have pain in one area of your abdomen, or in your entire abdomen  Your pain may be caused by constipation, food sensitivity or poisoning, infection, or a blockage  Abdominal pain can also be caused by a hernia, appendicitis, or an ulcer  The cause of your abdominal pain may be unknown  Seek immediate care for the following symptoms:   · New chest pain or shortness of breath    · Pulsing pain in your upper abdomen or lower back that suddenly becomes constant    · Pain in the right lower abdominal area that worsens with movement    · Fever over 100 4°F (38°C) or shaking chills    · Vomiting and you cannot keep food or fluids down    · Pain does not improve or gets worse over the next 8 to 12 hours    · Blood in your vomit or bowel movements, or they look black and tarry    · Skin or the whites of your eyes turn yellow    · Large amount of vaginal bleeding that is not your monthly period  Treatment for abdominal pain  may include medicine to calm your stomach, prevent vomiting, or decrease pain  Follow up with your healthcare provider as directed:  Write down your questions so you remember to ask them during your visits  CARE AGREEMENT:   You have the right to help plan your care  Learn about your health condition and how it may be treated  Discuss treatment options with your caregivers to decide what care you want to receive  You always have the right to refuse treatment  The above information is an  only  It is not intended as medical advice for individual conditions or treatments  Talk to your doctor, nurse or pharmacist before following any medical regimen to see if it is safe and effective for you  © 2014 [address] is for End User's use only and may not be sold, redistributed or otherwise used for commercial purposes   All illustrations and images included in [facility] are the copyrighted property of A D [employer] M [employer] Inc  or Alejandro Gastelum  Ruptured Ovarian Cyst   WHAT YOU NEED TO KNOW:   A ruptured ovarian cyst is a cyst that breaks open  A cyst is a sac that grows on an ovary  This sac usually contains fluid, but may sometimes have blood or tissue in it  A large cyst that ruptures may lead to problems that need immediate care  It is important to follow up with your healthcare provider to make sure your cyst went away completely with treatment  DISCHARGE INSTRUCTIONS:   Call 911 for any of the following:   · You are too weak or dizzy to stand up  Return to the emergency department if:   · You have severe pain in your pelvis or in your abdomen  · You have pain along with a fever, nausea, or vomiting  · You have signs of shock from blood loss, such as dizziness, cold or clammy skin, or fast breathing  Contact your healthcare provider if:   · You notice changes in your monthly periods, or you begin to have nausea or vomiting with your periods  · You have new or worsening symptoms  · Your pain does not get better with pain medicine  · You have pain during sex  · You have bleeding from your vagina that is not your period  · Your abdomen is swollen, or you have a full or heavy feeling in your lower abdomen  · You have trouble urinating  · You have questions or concerns about your condition or care  Medicines: You may need any of the following:  · NSAIDs , such as ibuprofen, help decrease swelling, pain, and fever  This medicine is available with or without a doctor's order  NSAIDs can cause stomach bleeding or kidney problems in certain people  If you take blood thinner medicine, always ask if NSAIDs are safe for you  Always read the medicine label and follow directions  Do not give these medicines to children under 10months of age without direction from your child's healthcare provider       · Antibiotics  may be given to prevent or fight an infection caused by bacteria  · Take your medicine as directed  Contact your healthcare provider if you think your medicine is not helping or if you have side effects  Tell him or her if you are allergic to any medicine  Keep a list of the medicines, vitamins, and herbs you take  Include the amounts, and when and why you take them  Bring the list or the pill bottles to follow-up visits  Carry your medicine list with you in case of an emergency  Manage or prevent a ruptured ovarian cyst:   · Apply heat where you have pain, as directed  Heat can help relieve mild pain  Use a heating pad (set on low) or hot water bottle  Wrap the pad or bottle in a towel before you apply it to your skin  Apply heat for 20 minutes every hour, or as directed  A warm bath may also help relieve the pain  · Ask when to come in for a follow-up examination  You may need another ultrasound 6 weeks after your cyst was treated  This will help make sure the cyst is no longer growing or causing health problems  You may also need ultrasound tests for 2 or 3 monthly periods to see how hormones affect your ovaries  · Ask about birth control pills  These may help reduce your risk for cysts  Ask your healthcare provider if birth control pills are right for you  The risk for a blood clot is higher if you take birth control pills, especially if you are older than 35 or smoke  · Have a pelvic exam every year  This may also be called a well woman visit  The exam will include a Pap smear to check for certain cancers  Your healthcare provider will also press on your abdomen to check for lumps or other problems  A pelvic exam can help find problems early  This makes treatment easier and more effective  Tell your healthcare provider if you notice any changes in your monthly periods   Examples include periods that start on a different day than usual, or are lighter or heavier than usual  Tell your provider if you have worse pain than usual, or if the pain is different than you had before  Follow up with your healthcare provider as directed:  Write down your questions so you remember to ask them during your visits  © 2017 [address] Information is for End User's use only and may not be sold, redistributed or otherwise used for commercial purposes  All illustrations and images included in [employer]Notes® are the copyrighted property of [employer] A M , [employer]  or Alejandro Gastelum  The above information is an  only  It is not intended as medical advice for individual conditions or treatments  Talk to your doctor, nurse or pharmacist before following any medical regimen to see if it is safe and effective for you

## 2018-10-18 NOTE — ASSESSMENT & PLAN NOTE
Complex ovarian cyst and the left side is being evaluated MRI result is pending  There is a possibility of fluid leak from the cyst which is leading to pelvic pain which is sudden in onset

## 2018-10-18 NOTE — PROGRESS NOTES
Assessment/Plan:    Problem List Items Addressed This Visit        Genitourinary    Complex ovarian cyst     Complex ovarian cyst and the left side is being evaluated MRI result is pending  There is a possibility of fluid leak from the cyst which is leading to pelvic pain which is sudden in onset         Relevant Orders    Ambulatory referral to Gynecologic Oncology       Other    Acute abdominal pain - Primary     She presented with acute abdominal pain started this morning, she is being evaluated for a complex cyst in the left ovary and MRI result is pending, as she is very tender and pain is significant, advised to go to the ER there is possibility of rupture of the cyst, she needs further evaluation and I spoke to the ER physician and send the patient to [address] [place] [address]               Chief Complaint   Patient presents with    Abdominal Pain       Subjective:   Patient ID: Wiliam Bobo is a 37 y o  female  She came today with severe abdominal pain started this morning and she felt sharp pain going in the middle of her pelvis to worse her rectum and perineum she said she had a feeling to go to the bathroom and she had a bowel movement which was normal but pain did not relieve, she took 2 ibuprofen which made  some difference, but the pain is continuous and dull in character now and is significant she is unable to lay straight, she has no urinary symptoms,  She has history of endometriosis and previous hysterectomy done in [facility], and now she has been having left abdominal pain for last few weeks and ultrasound revealed a complex to solid cyst and she had MRI of the pelvis done yesterday the result is pending, but this sharp pain started this morning, she denies any fever chills  She is diabetic      Abdominal Pain   This is a new problem  The current episode started today  The onset quality is sudden  The pain is located in the suprapubic region   The pain is at a severity of 8/10  The pain is moderate  The quality of the pain is sharp and tearing  The abdominal pain does not radiate  Pertinent negatives include no anorexia, arthralgias, belching, constipation, diarrhea, dysuria, fever, flatus, frequency, headaches, hematochezia, hematuria, melena, myalgias, nausea, vomiting or weight loss  The pain is relieved by nothing  The treatment provided mild relief  Prior diagnostic workup includes ultrasound (mri pelvis )  Her past medical history is significant for abdominal surgery  endometriosis       Review of Systems   Constitutional: Negative for activity change, appetite change, chills, diaphoresis, fatigue, fever, unexpected weight change and weight loss  HENT: Negative for congestion, dental problem, ear discharge, ear pain, facial swelling, hearing loss, mouth sores, nosebleeds, postnasal drip, rhinorrhea, sinus pain, sinus pressure, sneezing, sore throat, trouble swallowing and voice change  Eyes: Negative for photophobia, pain, discharge, redness and itching  Respiratory: Negative for cough, chest tightness, shortness of breath and wheezing  Cardiovascular: Negative for chest pain, palpitations and leg swelling  Gastrointestinal: Positive for abdominal pain  Negative for abdominal distention, anorexia, blood in stool, constipation, diarrhea, flatus, hematochezia, melena, nausea and vomiting  Endocrine: Negative for cold intolerance, heat intolerance, polydipsia, polyphagia and polyuria  Genitourinary: Negative for dysuria, flank pain, frequency, hematuria and urgency  Musculoskeletal: Negative for arthralgias, back pain, myalgias and neck pain  Skin: Negative for color change and pallor  Allergic/Immunologic: Negative for environmental allergies and food allergies  Neurological: Negative for dizziness, weakness, light-headedness, numbness and headaches  Hematological: Negative for adenopathy  Does not bruise/bleed easily     Psychiatric/Behavioral: Negative for behavioral problems, sleep disturbance and suicidal ideas  The patient is not nervous/anxious  Objective:  Physical Exam   Constitutional: She is oriented to person, place, and time  She appears well-developed and well-nourished  HENT:   Head: Normocephalic and atraumatic  Nose: Nose normal    Mouth/Throat: Oropharynx is clear and moist  No oropharyngeal exudate  Eyes: EOM are normal  Right eye exhibits no discharge  Left eye exhibits no discharge  No scleral icterus  Neck: Normal range of motion  Neck supple  No tracheal deviation present  Cardiovascular: Normal rate, regular rhythm and normal heart sounds  No murmur heard  Pulmonary/Chest: Effort normal and breath sounds normal  No respiratory distress  She has no wheezes  She has no rales  Abdominal: Soft  Bowel sounds are normal  She exhibits no distension and no mass  There is tenderness  There is rebound  Musculoskeletal: Normal range of motion  She exhibits no edema  Lymphadenopathy:     She has no cervical adenopathy  Neurological: She is alert and oriented to person, place, and time  She has normal reflexes  No cranial nerve deficit  Skin: Skin is warm  No rash noted  No erythema  No pallor  Psychiatric: She has a normal mood and affect  Her behavior is normal  Judgment and thought content normal    Nursing note and vitals reviewed           Past Surgical History:   Procedure Laterality Date    APPENDECTOMY      TOTAL ABDOMINAL HYSTERECTOMY         Family History   Problem Relation Age of Onset    Diabetes Mother     Hyperlipidemia Father     No Known Problems Son          Current Outpatient Prescriptions:     atorvastatin (LIPITOR) 10 mg tablet, Take 1 tablet (10 mg total) by mouth daily, Disp: 90 tablet, Rfl: 1    ferrous sulfate 324 (65 Fe) mg, Take 1 tablet (324 mg total) by mouth daily before breakfast, Disp: 90 tablet, Rfl: 1    fluticasone (FLONASE) 50 mcg/act nasal spray, 2 sprays into each nostril daily, Disp: 16 g, Rfl: 1    glucose blood (ACCU-CHEK ACTIVE STRIPS) test strip, 1 Squirt by In Vitro route 2 (two) times a day, Disp: , Rfl:     montelukast (SINGULAIR) 10 mg tablet, Take 1 tablet (10 mg total) by mouth daily at bedtime, Disp: 90 tablet, Rfl: 1    omeprazole (PriLOSEC) 20 mg delayed release capsule, Take 1 capsule (20 mg total) by mouth daily, Disp: 90 capsule, Rfl: 1    ONE TOUCH LANCETS MISC, OneTouch Lancets Miscellaneous check blood sugar 1-2 times daily , Dx 250  Quantity: 1;  Refills: 5   [facility]  , [place] ;  Start 8-July-2014 Active 200 Miscellaneous Box, Disp: , Rfl:     sitaGLIPtin-metFORMIN (JANUMET)  MG per tablet, Take 1 tablet by mouth daily, Disp: 90 tablet, Rfl: 1  No current facility-administered medications for this visit       Allergies   Allergen Reactions    Acetaminophen-Codeine      Reaction Date: [id number]; TINGLING    Latex Rash       Vitals:    10/18/18 1126   BP: 142/82   Pulse: 100   Resp: 18   Temp: 97 9 °F (36 6 °C)   SpO2: 99%   Weight: 88 kg (194 lb)   Height: 5' 6" (1 676 m)

## 2018-10-22 ENCOUNTER — TELEPHONE (OUTPATIENT)
Dept: FAMILY MEDICINE CLINIC | Facility: CLINIC | Age: 43
End: 2018-10-22

## 2018-10-22 ENCOUNTER — OFFICE VISIT (OUTPATIENT)
Dept: FAMILY MEDICINE CLINIC | Facility: CLINIC | Age: 43
End: 2018-10-22
Payer: COMMERCIAL

## 2018-10-22 VITALS
BODY MASS INDEX: 31.18 KG/M2 | DIASTOLIC BLOOD PRESSURE: 70 MMHG | RESPIRATION RATE: 16 BRPM | SYSTOLIC BLOOD PRESSURE: 118 MMHG | OXYGEN SATURATION: 99 % | HEIGHT: 66 IN | WEIGHT: 194 LBS | HEART RATE: 77 BPM

## 2018-10-22 DIAGNOSIS — E11.9 TYPE 2 DIABETES MELLITUS WITHOUT COMPLICATION, WITHOUT LONG-TERM CURRENT USE OF INSULIN (HCC): ICD-10-CM

## 2018-10-22 DIAGNOSIS — R10.2 PELVIC PAIN IN FEMALE: ICD-10-CM

## 2018-10-22 DIAGNOSIS — N83.299 COMPLEX OVARIAN CYST: Primary | ICD-10-CM

## 2018-10-22 DIAGNOSIS — D50.9 IRON DEFICIENCY ANEMIA, UNSPECIFIED IRON DEFICIENCY ANEMIA TYPE: Primary | ICD-10-CM

## 2018-10-22 DIAGNOSIS — N83.299 COMPLEX OVARIAN CYST: ICD-10-CM

## 2018-10-22 PROCEDURE — 99214 OFFICE O/P EST MOD 30 MIN: CPT | Performed by: FAMILY MEDICINE

## 2018-10-22 NOTE — ASSESSMENT & PLAN NOTE
Her hemoglobin dropped slightly as of the cyst rupture, advised to take ferrous sulfate every day for at least 3 months,

## 2018-10-22 NOTE — TELEPHONE ENCOUNTER
Patient called in to say that she called the Gyn/Oncologist and spoke to Ember Leone who told her they reviewed her chart and said that she does not need to see them, because she only has a "normal cyst on her ovary since the other one ruptured and she should go see a regular ob/gyn"  She said she called Dr Tova Cash office [phone]   Her question to Dr Mohini Parisi is this- is it ok to see a regular gynecologist? And will she need a new order for a regular gyn instead of the gyn/onclogist? She would like a call back from Dr Mohini Parisi

## 2018-10-22 NOTE — TELEPHONE ENCOUNTER
Please inform her that it is okay to see gynecologist then,as gyne/oncologist  thinks they dont have to see her  and I put a referral for that, she can use any gynecologist ,

## 2018-10-22 NOTE — ASSESSMENT & PLAN NOTE
Discussed with her that she probably had a tiny cyst ruptured and that led to acute abdominal pain which is not resolving, her  MRI and CT scan shows she has complex cyst which she needs follow-up with gynecologist,  And she will make an appointment  Ted Oshea

## 2018-10-22 NOTE — PROGRESS NOTES
Assessment/Plan:    Problem List Items Addressed This Visit        Endocrine    Type 2 diabetes mellitus ([facility] [address] )    Relevant Orders    Diabetic foot exam       Genitourinary    Complex ovarian cyst     Discussed with her that she probably had a tiny cyst ruptured and that led to acute abdominal pain which is not resolving, her  MRI and CT scan shows she has complex cyst which she needs follow-up with gynecologist,  And she will make an appointment               Other    Pelvic pain in female    Iron deficiency anemia - Primary     Her hemoglobin dropped slightly as of the cyst rupture, advised to take ferrous sulfate every day for at least 3 months,               Chief Complaint   Patient presents with    Follow-up     from er       Subjective:   Patient ID: Ja Oden is a 37 y o  female  She says that her abdominal pain is very minimal in the lower abdomen, she is not taking regular pain medicine, as she presented last time with acute abdominal pain and was sent to the ER, CT scan was done for abdomen and pelvis and her labs were done, she had minimal leakage of fluid in the pelvis which was probably from ruptured ovarian cyst, in the left ovary  Her hemoglobin also dropped slightly, she is not dizzy lightheaded,  She is diabetic and which has been stable  She says she started taking ferrous sulfate 1 tablet daily since she noted her hemoglobin was low,  She has normal urine and bowel movements        Review of Systems   Constitutional: Negative for activity change, appetite change, chills, diaphoresis, fatigue, fever and unexpected weight change  HENT: Negative for congestion, dental problem, ear discharge, ear pain, facial swelling, hearing loss, mouth sores, nosebleeds, postnasal drip, rhinorrhea, sinus pain, sinus pressure, sneezing, sore throat, trouble swallowing and voice change  Eyes: Negative for photophobia, pain, discharge, redness and itching     Respiratory: Negative for cough, chest tightness, shortness of breath and wheezing  Cardiovascular: Negative for chest pain, palpitations and leg swelling  Gastrointestinal: Negative for abdominal distention, abdominal pain, blood in stool, constipation, diarrhea and nausea  Endocrine: Negative for cold intolerance, heat intolerance, polydipsia, polyphagia and polyuria  Genitourinary: Negative for dysuria, flank pain, frequency, hematuria and urgency  Musculoskeletal: Negative for arthralgias, back pain, myalgias and neck pain  Skin: Negative for color change and pallor  Allergic/Immunologic: Negative for environmental allergies and food allergies  Neurological: Negative for dizziness, weakness, light-headedness, numbness and headaches  Hematological: Negative for adenopathy  Does not bruise/bleed easily  Psychiatric/Behavioral: Negative for behavioral problems, sleep disturbance and suicidal ideas  The patient is not nervous/anxious  Objective:  Physical Exam   Constitutional: She is oriented to person, place, and time  She appears well-developed and well-nourished  HENT:   Head: Normocephalic and atraumatic  Nose: Nose normal    Mouth/Throat: Oropharynx is clear and moist  No oropharyngeal exudate  Eyes: Pupils are equal, round, and reactive to light  Conjunctivae and EOM are normal  Right eye exhibits no discharge  Left eye exhibits no discharge  No scleral icterus  Neck: Normal range of motion  Neck supple  No tracheal deviation present  No thyromegaly present  Cardiovascular: Normal rate, regular rhythm and normal heart sounds  No murmur heard  Pulses:       Dorsalis pedis pulses are 2+ on the right side, and 2+ on the left side  Pulmonary/Chest: Effort normal and breath sounds normal  No respiratory distress  She has no wheezes  She has no rales  Abdominal: Soft  Bowel sounds are normal  She exhibits no distension and no mass  There is tenderness  There is no rebound     Minimal tenderness in the lower abdomen on the left side   Musculoskeletal: Normal range of motion  She exhibits no edema  Feet:   Right Foot:   Skin Integrity: Negative for ulcer, skin breakdown, erythema, warmth, callus or dry skin  Left Foot:   Skin Integrity: Negative for ulcer, skin breakdown, erythema, warmth, callus or dry skin  Lymphadenopathy:     She has no cervical adenopathy  Neurological: She is alert and oriented to person, place, and time  She has normal reflexes  No cranial nerve deficit  Skin: Skin is warm  No rash noted  No erythema  No pallor  Psychiatric: She has a normal mood and affect  Her behavior is normal  Judgment and thought content normal    Nursing note and vitals reviewed  Patient's shoes and socks removed  Right Foot/Ankle   Right Foot Inspection  Skin Exam: skin normal skin not intact, no dry skin, no warmth, no callus, no erythema, no maceration, no abnormal color, no pre-ulcer, no ulcer and no callus                          Toe Exam: ROM and strength within normal limits  Sensory   Vibration: intact  Proprioception: intact   Monofilament testing: intact  Vascular  Capillary refills: < 3 seconds  The right DP pulse is 2+  Left Foot/Ankle  Left Foot Inspection  Skin Exam: skin normalskin not intact, no dry skin, no warmth, no erythema, no maceration, normal color, no pre-ulcer, no ulcer and no callus                         Toe Exam: ROM and strength within normal limits                   Sensory   Vibration: intact  Proprioception: intact  Monofilament: intact  Vascular  Capillary refills: < 3 seconds  The left DP pulse is 2+     Assign Risk Category:  ; ;        Risk: 0      Past Surgical History:   Procedure Laterality Date    APPENDECTOMY      TOTAL ABDOMINAL HYSTERECTOMY         Family History   Problem Relation Age of Onset    Diabetes Mother     Hyperlipidemia Father     No Known Problems Son          Current Outpatient Prescriptions:     atorvastatin (LIPITOR) 10 mg tablet, Take 1 tablet (10 mg total) by mouth daily, Disp: 90 tablet, Rfl: 1    ferrous sulfate 324 (65 Fe) mg, Take 1 tablet (324 mg total) by mouth daily before breakfast, Disp: 90 tablet, Rfl: 1    fluticasone (FLONASE) 50 mcg/act nasal spray, 2 sprays into each nostril daily, Disp: 16 g, Rfl: 1    glucose blood (ACCU-CHEK ACTIVE STRIPS) test strip, 1 Squirt by In Vitro route 2 (two) times a day, Disp: , Rfl:     montelukast (SINGULAIR) 10 mg tablet, Take 1 tablet (10 mg total) by mouth daily at bedtime, Disp: 90 tablet, Rfl: 1    naproxen (NAPROSYN) 500 mg tablet, Take 1 tablet (500 mg total) by mouth 2 (two) times a day with meals for 14 doses Prn pain, Disp: 14 tablet, Rfl: 0    omeprazole (PriLOSEC) 20 mg delayed release capsule, Take 1 capsule (20 mg total) by mouth daily, Disp: 90 capsule, Rfl: 1    ONE TOUCH LANCETS MISC, OneTouch Lancets Miscellaneous check blood sugar 1-2 times daily , Dx 250  Quantity: 1;  Refills: 5   [facility] ;  Start 8-July-2014 Active 200 Miscellaneous Box, Disp: , Rfl:     sitaGLIPtin-metFORMIN (JANUMET)  MG per tablet, Take 1 tablet by mouth daily, Disp: 90 tablet, Rfl: 1    Allergies   Allergen Reactions    Acetaminophen-Codeine      Reaction Date: [id number]; TINGLING    Latex Rash       Vitals:    10/22/18 1123 10/22/18 1138   BP:  118/70   Pulse: 77    Resp: 16    SpO2: 99%    Weight: 88 kg (194 lb)    Height: 5' 6" (1 676 m)

## 2018-10-29 ENCOUNTER — OFFICE VISIT (OUTPATIENT)
Dept: OBGYN CLINIC | Facility: CLINIC | Age: 43
End: 2018-10-29
Payer: COMMERCIAL

## 2018-10-29 VITALS
DIASTOLIC BLOOD PRESSURE: 76 MMHG | BODY MASS INDEX: 32.19 KG/M2 | HEIGHT: 65 IN | SYSTOLIC BLOOD PRESSURE: 118 MMHG | WEIGHT: 193.2 LBS

## 2018-10-29 DIAGNOSIS — N83.299 COMPLEX OVARIAN CYST: Primary | ICD-10-CM

## 2018-10-29 PROCEDURE — 99213 OFFICE O/P EST LOW 20 MIN: CPT | Performed by: OBSTETRICS & GYNECOLOGY

## 2018-11-30 ENCOUNTER — TRANSCRIBE ORDERS (OUTPATIENT)
Dept: LAB | Facility: CLINIC | Age: 43
End: 2018-11-30

## 2018-11-30 ENCOUNTER — APPOINTMENT (OUTPATIENT)
Dept: LAB | Facility: CLINIC | Age: 43
End: 2018-11-30
Payer: COMMERCIAL

## 2018-11-30 DIAGNOSIS — E11.9 TYPE 2 DIABETES MELLITUS WITHOUT COMPLICATION, WITHOUT LONG-TERM CURRENT USE OF INSULIN (HCC): ICD-10-CM

## 2018-11-30 DIAGNOSIS — E78.2 MIXED HYPERLIPIDEMIA: ICD-10-CM

## 2018-11-30 LAB
ALBUMIN SERPL BCP-MCNC: 3.4 G/DL (ref 3.5–5)
ALP SERPL-CCNC: 109 U/L (ref 46–116)
ALT SERPL W P-5'-P-CCNC: 40 U/L (ref 12–78)
ANION GAP SERPL CALCULATED.3IONS-SCNC: 8 MMOL/L (ref 4–13)
AST SERPL W P-5'-P-CCNC: 23 U/L (ref 5–45)
BASOPHILS # BLD AUTO: 0.02 THOUSANDS/ΜL (ref 0–0.1)
BASOPHILS NFR BLD AUTO: 0 % (ref 0–1)
BILIRUB SERPL-MCNC: 0.5 MG/DL (ref 0.2–1)
BUN SERPL-MCNC: 11 MG/DL (ref 5–25)
CALCIUM SERPL-MCNC: 8.8 MG/DL (ref 8.3–10.1)
CHLORIDE SERPL-SCNC: 105 MMOL/L (ref 100–108)
CHOLEST SERPL-MCNC: 174 MG/DL (ref 50–200)
CO2 SERPL-SCNC: 29 MMOL/L (ref 21–32)
CREAT SERPL-MCNC: 0.71 MG/DL (ref 0.6–1.3)
CREAT UR-MCNC: 249 MG/DL
EOSINOPHIL # BLD AUTO: 0.11 THOUSAND/ΜL (ref 0–0.61)
EOSINOPHIL NFR BLD AUTO: 2 % (ref 0–6)
ERYTHROCYTE [DISTWIDTH] IN BLOOD BY AUTOMATED COUNT: 13.7 % (ref 11.6–15.1)
EST. AVERAGE GLUCOSE BLD GHB EST-MCNC: 148 MG/DL
GFR SERPL CREATININE-BSD FRML MDRD: 103 ML/MIN/1.73SQ M
GLUCOSE P FAST SERPL-MCNC: 145 MG/DL (ref 65–99)
HBA1C MFR BLD: 6.8 % (ref 4.2–6.3)
HCT VFR BLD AUTO: 38.1 % (ref 34.8–46.1)
HDLC SERPL-MCNC: 48 MG/DL (ref 40–60)
HGB BLD-MCNC: 11.9 G/DL (ref 11.5–15.4)
IMM GRANULOCYTES # BLD AUTO: 0.01 THOUSAND/UL (ref 0–0.2)
IMM GRANULOCYTES NFR BLD AUTO: 0 % (ref 0–2)
LDLC SERPL CALC-MCNC: 94 MG/DL (ref 0–100)
LYMPHOCYTES # BLD AUTO: 1.94 THOUSANDS/ΜL (ref 0.6–4.47)
LYMPHOCYTES NFR BLD AUTO: 29 % (ref 14–44)
MCH RBC QN AUTO: 25.8 PG (ref 26.8–34.3)
MCHC RBC AUTO-ENTMCNC: 31.2 G/DL (ref 31.4–37.4)
MCV RBC AUTO: 83 FL (ref 82–98)
MICROALBUMIN UR-MCNC: 20.8 MG/L (ref 0–20)
MICROALBUMIN/CREAT 24H UR: 8 MG/G CREATININE (ref 0–30)
MONOCYTES # BLD AUTO: 0.3 THOUSAND/ΜL (ref 0.17–1.22)
MONOCYTES NFR BLD AUTO: 5 % (ref 4–12)
NEUTROPHILS # BLD AUTO: 4.21 THOUSANDS/ΜL (ref 1.85–7.62)
NEUTS SEG NFR BLD AUTO: 64 % (ref 43–75)
NONHDLC SERPL-MCNC: 126 MG/DL
NRBC BLD AUTO-RTO: 0 /100 WBCS
PLATELET # BLD AUTO: 257 THOUSANDS/UL (ref 149–390)
PMV BLD AUTO: 9.9 FL (ref 8.9–12.7)
POTASSIUM SERPL-SCNC: 3.6 MMOL/L (ref 3.5–5.3)
PROT SERPL-MCNC: 7 G/DL (ref 6.4–8.2)
RBC # BLD AUTO: 4.62 MILLION/UL (ref 3.81–5.12)
SODIUM SERPL-SCNC: 142 MMOL/L (ref 136–145)
TRIGL SERPL-MCNC: 159 MG/DL
WBC # BLD AUTO: 6.59 THOUSAND/UL (ref 4.31–10.16)

## 2018-11-30 PROCEDURE — 80053 COMPREHEN METABOLIC PANEL: CPT

## 2018-11-30 PROCEDURE — 82043 UR ALBUMIN QUANTITATIVE: CPT | Performed by: FAMILY MEDICINE

## 2018-11-30 PROCEDURE — 83036 HEMOGLOBIN GLYCOSYLATED A1C: CPT

## 2018-11-30 PROCEDURE — 80061 LIPID PANEL: CPT

## 2018-11-30 PROCEDURE — 36415 COLL VENOUS BLD VENIPUNCTURE: CPT

## 2018-11-30 PROCEDURE — 82570 ASSAY OF URINE CREATININE: CPT | Performed by: FAMILY MEDICINE

## 2018-11-30 PROCEDURE — 85025 COMPLETE CBC W/AUTO DIFF WBC: CPT

## 2018-11-30 PROCEDURE — 3061F NEG MICROALBUMINURIA REV: CPT | Performed by: FAMILY MEDICINE

## 2018-12-03 ENCOUNTER — OFFICE VISIT (OUTPATIENT)
Dept: FAMILY MEDICINE CLINIC | Facility: CLINIC | Age: 43
End: 2018-12-03
Payer: COMMERCIAL

## 2018-12-03 VITALS
WEIGHT: 191.8 LBS | SYSTOLIC BLOOD PRESSURE: 120 MMHG | RESPIRATION RATE: 18 BRPM | OXYGEN SATURATION: 96 % | HEIGHT: 65 IN | HEART RATE: 89 BPM | BODY MASS INDEX: 31.96 KG/M2 | DIASTOLIC BLOOD PRESSURE: 76 MMHG

## 2018-12-03 DIAGNOSIS — N83.299 COMPLEX OVARIAN CYST: ICD-10-CM

## 2018-12-03 DIAGNOSIS — K21.9 GERD WITHOUT ESOPHAGITIS: ICD-10-CM

## 2018-12-03 DIAGNOSIS — D50.9 IRON DEFICIENCY ANEMIA, UNSPECIFIED IRON DEFICIENCY ANEMIA TYPE: ICD-10-CM

## 2018-12-03 DIAGNOSIS — J30.89 NON-SEASONAL ALLERGIC RHINITIS, UNSPECIFIED TRIGGER: ICD-10-CM

## 2018-12-03 DIAGNOSIS — E11.9 TYPE 2 DIABETES MELLITUS WITHOUT COMPLICATION, WITHOUT LONG-TERM CURRENT USE OF INSULIN (HCC): Primary | ICD-10-CM

## 2018-12-03 DIAGNOSIS — K59.03 DRUG-INDUCED CONSTIPATION: ICD-10-CM

## 2018-12-03 DIAGNOSIS — E78.2 MIXED HYPERLIPIDEMIA: ICD-10-CM

## 2018-12-03 DIAGNOSIS — R10.2 PELVIC PAIN IN FEMALE: ICD-10-CM

## 2018-12-03 PROBLEM — R10.9 ACUTE ABDOMINAL PAIN: Status: RESOLVED | Noted: 2018-10-18 | Resolved: 2018-12-03

## 2018-12-03 PROCEDURE — 3008F BODY MASS INDEX DOCD: CPT | Performed by: FAMILY MEDICINE

## 2018-12-03 PROCEDURE — 99214 OFFICE O/P EST MOD 30 MIN: CPT | Performed by: FAMILY MEDICINE

## 2018-12-03 RX ORDER — OMEPRAZOLE 20 MG/1
20 CAPSULE, DELAYED RELEASE ORAL DAILY
Qty: 90 CAPSULE | Refills: 0 | Status: SHIPPED | OUTPATIENT
Start: 2018-12-03 | End: 2019-02-13 | Stop reason: SDUPTHER

## 2018-12-03 RX ORDER — MONTELUKAST SODIUM 10 MG/1
10 TABLET ORAL
Qty: 90 TABLET | Refills: 1 | Status: SHIPPED | OUTPATIENT
Start: 2018-12-03 | End: 2019-02-13 | Stop reason: SDUPTHER

## 2018-12-03 RX ORDER — OMEPRAZOLE 20 MG/1
20 CAPSULE, DELAYED RELEASE ORAL DAILY
Qty: 90 CAPSULE | Refills: 1 | Status: SHIPPED | OUTPATIENT
Start: 2018-12-03 | End: 2018-12-03 | Stop reason: SDUPTHER

## 2018-12-03 RX ORDER — FLUTICASONE PROPIONATE 50 MCG
2 SPRAY, SUSPENSION (ML) NASAL DAILY
Qty: 16 G | Refills: 3 | Status: SHIPPED | OUTPATIENT
Start: 2018-12-03 | End: 2019-07-29 | Stop reason: SDUPTHER

## 2018-12-03 RX ORDER — MONTELUKAST SODIUM 10 MG/1
10 TABLET ORAL
Qty: 90 TABLET | Refills: 1 | Status: SHIPPED | OUTPATIENT
Start: 2018-12-03 | End: 2018-12-03 | Stop reason: SDUPTHER

## 2018-12-03 RX ORDER — ATORVASTATIN CALCIUM 10 MG/1
10 TABLET, FILM COATED ORAL DAILY
Qty: 90 TABLET | Refills: 1 | Status: SHIPPED | OUTPATIENT
Start: 2018-12-03 | End: 2018-12-03 | Stop reason: SDUPTHER

## 2018-12-03 RX ORDER — ATORVASTATIN CALCIUM 10 MG/1
10 TABLET, FILM COATED ORAL DAILY
Qty: 90 TABLET | Refills: 1 | Status: SHIPPED | OUTPATIENT
Start: 2018-12-03 | End: 2019-02-13 | Stop reason: SDUPTHER

## 2018-12-03 NOTE — ASSESSMENT & PLAN NOTE
Lipid panel improved, continue low-fat diet    And continue statin and have labs in 4 months in follow-up

## 2018-12-03 NOTE — ASSESSMENT & PLAN NOTE
Continue omeprazole as she has still indigestion, and sometimes feeling of gas tech in the esophagus advised to get endoscopy but patient does not want to do that

## 2018-12-03 NOTE — ASSESSMENT & PLAN NOTE
Intermittent left-sided pelvic pain, she has a cyst, following gynecologist she wants to go to her previous gynecologist who did previous surgery for endometriosis

## 2018-12-03 NOTE — PROGRESS NOTES
Assessment/Plan:    Problem List Items Addressed This Visit        Digestive    GERD without esophagitis     Continue omeprazole as she has still indigestion, and sometimes feeling of gas tech in the esophagus advised to get endoscopy but patient does not want to do that         Relevant Medications    omeprazole (PriLOSEC) 20 mg delayed release capsule    Drug-induced constipation     Advised to drink more fluids, fiber like Metamucil and stool softener Colace and avoid dulcolax            Endocrine    Type 2 diabetes mellitus ([facility] [address] ) - Primary     Lab Results   Component Value Date    HGBA1C 6 8 (H) 11/30/2018    She will continue same medication and continue low carb diet  And will repeat labs microalbuminuria improved and she says she had eye exam    No results for input(s): POCGLU in the last 72 hours  Blood Sugar Average: Last 72 hrs:           Relevant Medications    sitaGLIPtin-metFORMIN (JANUMET)  MG per tablet    Other Relevant Orders    CBC and differential    Hemoglobin A1C       Respiratory    Non-seasonal allergic rhinitis     Allergies stable continue both medications Singulair and Flonase         Relevant Medications    fluticasone (FLONASE) 50 mcg/act nasal spray    montelukast (SINGULAIR) 10 mg tablet       Genitourinary    Complex ovarian cyst    Relevant Orders    Follicle stimulating hormone       Other    Hyperlipidemia     Lipid panel improved, continue low-fat diet    And continue statin and have labs in 4 months in follow-up         Relevant Medications    atorvastatin (LIPITOR) 10 mg tablet    Other Relevant Orders    Comprehensive metabolic panel    Lipid panel    TSH, 3rd generation    Pelvic pain in female     Intermittent left-sided pelvic pain, she has a cyst, following gynecologist she wants to go to her previous gynecologist who did previous surgery for endometriosis         Iron deficiency anemia     Anemia improved and now she is not taking iron for few weeks, her constipation due to iron, advised to take stool softener Colace and Metamucil and avoid Dulcolax               Chief Complaint   Patient presents with    Follow-up       Subjective:   Patient ID: Danish Akers is a 37 y o  female  She is here follow-up on diabetes, she is taking her medication regularly  Denies any new headaches chest pain shortness of breath she intermittently gets left side abdominal discomfort and she thinks it could be overly shin as she has a cyst in the left side of ovary and she is following gynecologist and they ordered a ultrasound for her ,previously she had a CT scan of her abdomen,  She has been eating better and she says she eats more night and date and her cholesterol improving  She always feel that she has some indigestion and bloating, she is unable to lose weight from her abdomen  Her seasonal allergies are under control and she wants all medication to be refilled  She stop taking iron as she is taking and dates which is more iron  She also gets constipation with iron and now she is taking Dulcolax even she stop taking iron  She says as she has hysterectomy ,she does not know whether  she is going through menopause her still her ovaries are working, she wants to know her hormone level checked for menopausal state        Review of Systems   Constitutional: Negative for activity change, appetite change, chills, diaphoresis, fatigue, fever and unexpected weight change  HENT: Negative for congestion, dental problem, ear discharge, ear pain, facial swelling, hearing loss, mouth sores, nosebleeds, postnasal drip, rhinorrhea, sinus pain, sinus pressure, sneezing, sore throat, trouble swallowing and voice change  Eyes: Negative for photophobia, pain, discharge, redness and itching  Respiratory: Negative for cough, chest tightness, shortness of breath and wheezing  Cardiovascular: Negative for chest pain, palpitations and leg swelling     Gastrointestinal: Negative for abdominal distention, abdominal pain, blood in stool, constipation, diarrhea and nausea  Endocrine: Negative for cold intolerance, heat intolerance, polydipsia, polyphagia and polyuria  Genitourinary: Negative for dysuria, flank pain, frequency, hematuria and urgency  Musculoskeletal: Negative for arthralgias, back pain, myalgias and neck pain  Skin: Negative for color change and pallor  Allergic/Immunologic: Negative for environmental allergies and food allergies  Neurological: Negative for dizziness, weakness, light-headedness, numbness and headaches  Hematological: Negative for adenopathy  Does not bruise/bleed easily  Psychiatric/Behavioral: Negative for behavioral problems, sleep disturbance and suicidal ideas  The patient is not nervous/anxious  Objective:  Physical Exam   Constitutional: She is oriented to person, place, and time  She appears well-developed and well-nourished  HENT:   Head: Normocephalic and atraumatic  Nose: Nose normal    Mouth/Throat: Oropharynx is clear and moist  No oropharyngeal exudate  Eyes: Pupils are equal, round, and reactive to light  Conjunctivae and EOM are normal  Right eye exhibits no discharge  Left eye exhibits no discharge  No scleral icterus  Neck: Normal range of motion  Neck supple  No tracheal deviation present  No thyromegaly present  Cardiovascular: Normal rate, regular rhythm and normal heart sounds  No murmur heard  Pulmonary/Chest: Effort normal and breath sounds normal  No respiratory distress  She has no wheezes  She has no rales  Abdominal: Soft  Bowel sounds are normal  She exhibits no distension and no mass  There is no tenderness  There is no rebound  Musculoskeletal: Normal range of motion  She exhibits no edema  Lymphadenopathy:     She has no cervical adenopathy  Neurological: She is alert and oriented to person, place, and time  She has normal reflexes  No cranial nerve deficit  Skin: Skin is warm   No rash noted  No erythema  No pallor  Psychiatric: She has a normal mood and affect  Her behavior is normal  Judgment and thought content normal    Nursing note and vitals reviewed           Past Surgical History:   Procedure Laterality Date    APPENDECTOMY      TOTAL ABDOMINAL HYSTERECTOMY         Family History   Problem Relation Age of Onset    Diabetes Mother     Hyperlipidemia Father     No Known Problems Son     Diabetes Maternal Grandmother          Current Outpatient Prescriptions:     atorvastatin (LIPITOR) 10 mg tablet, Take 1 tablet (10 mg total) by mouth daily, Disp: 90 tablet, Rfl: 1    fluticasone (FLONASE) 50 mcg/act nasal spray, 2 sprays into each nostril daily, Disp: 16 g, Rfl: 3    glucose blood (ACCU-CHEK ACTIVE STRIPS) test strip, 1 Squirt by In Vitro route 2 (two) times a day, Disp: , Rfl:     montelukast (SINGULAIR) 10 mg tablet, Take 1 tablet (10 mg total) by mouth daily at bedtime, Disp: 90 tablet, Rfl: 1    Multiple Vitamins-Minerals (MULTIVITAMIN GUMMIES ADULT PO), Take 1 tablet by mouth daily, Disp: , Rfl:     omeprazole (PriLOSEC) 20 mg delayed release capsule, Take 1 capsule (20 mg total) by mouth daily, Disp: 90 capsule, Rfl: 0    sitaGLIPtin-metFORMIN (JANUMET)  MG per tablet, Take 1 tablet by mouth daily, Disp: 90 tablet, Rfl: 1    ONE TOUCH LANCETS MISC, OneTouch Lancets Miscellaneous check blood sugar 1-2 times daily , Dx 250  Quantity: 1;  Refills: 5   Judy M D , [place] ;  Start 8-July-2014 Active 200 Miscellaneous Box, Disp: , Rfl:     Allergies   Allergen Reactions    Acetaminophen-Codeine      Reaction Date: [id number]; TINGLING    Tylenol [Acetaminophen]     Latex Rash       Vitals:    12/03/18 1111   BP: 120/76   Pulse: 89   Resp: 18   SpO2: 96%   Weight: 87 kg (191 lb 12 8 oz)   Height: 5' 5" (1 651 m)

## 2018-12-03 NOTE — ASSESSMENT & PLAN NOTE
Anemia improved and now she is not taking iron for few weeks, her constipation due to iron, advised to take stool softener Colace and Metamucil and avoid Dulcolax

## 2018-12-03 NOTE — ASSESSMENT & PLAN NOTE
Lab Results   Component Value Date    HGBA1C 6 8 (H) 11/30/2018    She will continue same medication and continue low carb diet  And will repeat labs microalbuminuria improved and she says she had eye exam    No results for input(s): POCGLU in the last 72 hours      Blood Sugar Average: Last 72 hrs:

## 2019-01-04 ENCOUNTER — HOSPITAL ENCOUNTER (OUTPATIENT)
Dept: ULTRASOUND IMAGING | Facility: HOSPITAL | Age: 44
Discharge: HOME | End: 2019-01-04
Attending: OBSTETRICS & GYNECOLOGY
Payer: COMMERCIAL

## 2019-01-04 ENCOUNTER — APPOINTMENT (OUTPATIENT)
Dept: LAB | Facility: CLINIC | Age: 44
End: 2019-01-04
Payer: COMMERCIAL

## 2019-01-04 DIAGNOSIS — N83.299 COMPLEX OVARIAN CYST: ICD-10-CM

## 2019-01-04 LAB — FSH SERPL-ACNC: 7.7 MIU/ML

## 2019-01-04 PROCEDURE — 76856 US EXAM PELVIC COMPLETE: CPT

## 2019-01-04 PROCEDURE — 36415 COLL VENOUS BLD VENIPUNCTURE: CPT

## 2019-01-04 PROCEDURE — 76830 TRANSVAGINAL US NON-OB: CPT

## 2019-01-04 PROCEDURE — 83001 ASSAY OF GONADOTROPIN (FSH): CPT

## 2019-01-09 ENCOUNTER — OFFICE VISIT (OUTPATIENT)
Dept: OBGYN CLINIC | Facility: CLINIC | Age: 44
End: 2019-01-09
Payer: COMMERCIAL

## 2019-01-09 VITALS — BODY MASS INDEX: 31.98 KG/M2 | WEIGHT: 192.2 LBS | SYSTOLIC BLOOD PRESSURE: 122 MMHG | DIASTOLIC BLOOD PRESSURE: 76 MMHG

## 2019-01-09 DIAGNOSIS — Z71.2 ENCOUNTER TO DISCUSS TEST RESULTS: Primary | ICD-10-CM

## 2019-01-09 PROCEDURE — 99213 OFFICE O/P EST LOW 20 MIN: CPT | Performed by: OBSTETRICS & GYNECOLOGY

## 2019-01-15 NOTE — PROGRESS NOTES
Assessment/Plan: 37year old with resolved left ovarian cyst  - no further follow up necessary  Return to office for annual exam         Subjective     Lawson Cheadle is a 37 y o  female here for ultrasound results  Patient had  A previous ultrasound showing a left ovarian cyst   Recent ultrasound shows resolution of left ovary  Has a history of hysterectomy      Patient Active Problem List   Diagnosis    Hyperlipidemia    Type 2 diabetes mellitus ([address] )    Intervertebral disc disorders with radiculopathy, lumbar region    GERD without esophagitis    Cervical disc disorder with radiculopathy of mid-cervical region    Loose skin    Non-seasonal allergic rhinitis    Anemia    Encounter for annual routine gynecological examination    Need for influenza vaccination    Breast pain, right    Pelvic pain in female    Complex ovarian cyst    Iron deficiency anemia    Drug-induced constipation       Gynecologic History  No LMP recorded (lmp unknown)  Patient has had a hysterectomy  Contraception: none    The following portions of the patient's history were reviewed and updated as appropriate: allergies, current medications, past family history, past medical history, past social history, past surgical history and problem list     Review of Systems  Pertinent items are noted in HPI  Objective    /76 (BP Location: Right arm, Patient Position: Sitting, Cuff Size: Standard)   Wt 87 2 kg (192 lb 3 2 oz)   LMP  (LMP Unknown)   BMI 31 98 kg/m²    GEN: The patient was alert and oriented x3, pleasant well-appearing female in no acute distress  US pelvis complete w transvaginal   Status: Final result   PACS Images     Show images for US pelvis complete w transvaginal   Order Report      Order Details   Order Questions     Question Answer Comment   Exam reason follow up left ovarian cyst    Note:  Enter reason for exam   Pregnant?  No           Reason For Exam     follow up left ovarian cyst   Dx: Complex ovarian cyst [N83 299 (ICD-10-CM)]   Study Result     PELVIC ULTRASOUND, COMPLETE     INDICATION:  37years old  N83 299: Other ovarian cyst, unspecified side  Follow-up of left ovarian cyst      COMPARISON: MRI pelvis dated October 17, 2018, pelvic ultrasound dated October 9, 2018      TECHNIQUE:   Transabdominal pelvic ultrasound was performed in sagittal and transverse planes with a curvilinear transducer  Additional transvaginal imaging was performed to better evaluate the endometrium and ovaries  Imaging included volumetric   sweeps as well as traditional still imaging technique      FINDINGS:     UTERUS: Status post hysterectomy  The vaginal cuff is unremarkable         OVARIES/ADNEXA:  Status post right oophorectomy  No suspicious right adnexal mass      The left ovary measures 1 9 x 2 0 x 2 1 cm and has normal sonographic appearance with several small follicles noted  Doppler flow is within normal limits  Previously noted complex left ovarian lesion has resolved, and likely represented a hemorrhagic   cyst   No suspicious adnexal mass            IMPRESSION:        1   Status post hysterectomy and right nephrectomy      2    Previously noted complex left ovarian lesion has resolved, and likely represented a hemorrhagic cyst    No suspicious adnexal mass                     Workstation performed: [id number]      Imaging     US pelvis complete w transvaginal (Order #[id number]) on 1/4/2019 - Imaging Information   Result History     US pelvis complete w transvaginal (Order #[id number]) on 1/7/2019 - Order Result History Report   Result Notes     Notes recorded by Ramírez Steward MD on 1/7/2019 at 2:56 PM EST  Resolution of left ovarian cyst          Patient Result Comments     Viewed by Benjamin Mckinnon on 1/7/2019  3:47 PM   Written by Stefan Mohr MD on 1/7/2019  2:56 PM   Mariia,     Your ultrasound result is listed   Your cyst on your left ovary has resolved   No further managament is necessary        Dr Tommy Aponte by     Signed Date/Time  Phone Pager   Ramiro JUAN 1/07/2019 14:53 [phone]    Exam Information     Status Exam Begun  Exam Ended    Final [99] 1/04/2019 11:22 1/04/2019 11:44   External Results Report     Open External Results Report    Encounter     View Encounter          Patient Care Timeline     No data selected in time range   Pre-op Summary     Pre-op           Recovery Summary     Recovery

## 2019-01-31 ENCOUNTER — OFFICE VISIT (OUTPATIENT)
Dept: FAMILY MEDICINE CLINIC | Facility: CLINIC | Age: 44
End: 2019-01-31
Payer: COMMERCIAL

## 2019-01-31 VITALS
HEIGHT: 65 IN | WEIGHT: 194 LBS | TEMPERATURE: 98.6 F | HEART RATE: 90 BPM | OXYGEN SATURATION: 96 % | DIASTOLIC BLOOD PRESSURE: 72 MMHG | SYSTOLIC BLOOD PRESSURE: 116 MMHG | BODY MASS INDEX: 32.32 KG/M2

## 2019-01-31 DIAGNOSIS — E11.9 TYPE 2 DIABETES MELLITUS WITHOUT COMPLICATION, WITHOUT LONG-TERM CURRENT USE OF INSULIN (HCC): ICD-10-CM

## 2019-01-31 DIAGNOSIS — J06.9 VIRAL UPPER RESPIRATORY TRACT INFECTION: Primary | ICD-10-CM

## 2019-01-31 DIAGNOSIS — J30.89 NON-SEASONAL ALLERGIC RHINITIS, UNSPECIFIED TRIGGER: ICD-10-CM

## 2019-01-31 PROCEDURE — 99214 OFFICE O/P EST MOD 30 MIN: CPT | Performed by: FAMILY MEDICINE

## 2019-01-31 PROCEDURE — 3008F BODY MASS INDEX DOCD: CPT | Performed by: FAMILY MEDICINE

## 2019-01-31 RX ORDER — DEXTROMETHORPHAN HYDROBROMIDE AND PROMETHAZINE HYDROCHLORIDE 15; 6.25 MG/5ML; MG/5ML
5 SYRUP ORAL 4 TIMES DAILY PRN
Qty: 118 ML | Refills: 0 | Status: SHIPPED | OUTPATIENT
Start: 2019-01-31 | End: 2019-03-14 | Stop reason: ALTCHOICE

## 2019-01-31 RX ORDER — AZITHROMYCIN 250 MG/1
TABLET, FILM COATED ORAL
Qty: 6 TABLET | Refills: 0 | Status: SHIPPED | OUTPATIENT
Start: 2019-01-31 | End: 2019-02-04

## 2019-01-31 NOTE — ASSESSMENT & PLAN NOTE
Lab Results   Component Value Date    HGBA1C 6 8 (H) 11/30/2018     She will continue she will continue same medications, and she will follow up whenever her next blood work is due  No results for input(s): POCGLU in the last 72 hours      Blood Sugar Average: Last 72 hrs:

## 2019-01-31 NOTE — PROGRESS NOTES
Assessment/Plan:    Problem List Items Addressed This Visit        Endocrine    Type 2 diabetes mellitus ([facility] [address] )     Lab Results   Component Value Date    HGBA1C 6 8 (H) 11/30/2018     She will continue she will continue same medications, and she will follow up whenever her next blood work is due  No results for input(s): POCGLU in the last 72 hours  Blood Sugar Average: Last 72 hrs:              Respiratory    Non-seasonal allergic rhinitis    Viral upper respiratory tract infection - Primary     She has most likely viral URI, her symptoms are not improving in 1 week, will start her on Zithromax and promethazine DM for cough and congestion and advised to not to stop Flonase and Singulair she will continue that for her now on seasonal allergies         Relevant Medications    promethazine-dextromethorphan (PHENERGAN-DM) 6 25-15 mg/5 mL oral syrup    azithromycin (ZITHROMAX) 250 mg tablet          Chief Complaint   Patient presents with    Cold Like Symptoms       Subjective:   Patient ID: Reji Monique is a 37 y o  female  She has seasonal allergies and she is on Singulair and Flonase, she says in last 2 months she has 2nd episode of cold symptoms and she is very sensitive to any fragrance she has no nasal congestion scratchy throat cough with clear mucus chills body aches for the last 7 days and is not getting better,  For last 2 days she stop taking Singulair and Flonase  She is diabetic and she says she has recent change in insurance she might need refills she will call us if he needs any refills  Review of Systems   Constitutional: Positive for chills and fatigue  Negative for activity change, appetite change, diaphoresis, fever and unexpected weight change  HENT: Positive for congestion and rhinorrhea   Negative for dental problem, ear discharge, ear pain, facial swelling, hearing loss, mouth sores, nosebleeds, postnasal drip, sinus pain, sinus pressure, sneezing, sore throat, trouble swallowing and voice change  Eyes: Negative for photophobia, pain, discharge, redness and itching  Respiratory: Positive for cough  Negative for chest tightness, shortness of breath and wheezing  Cardiovascular: Negative for chest pain, palpitations and leg swelling  Gastrointestinal: Negative for abdominal distention, abdominal pain, blood in stool, constipation, diarrhea and nausea  Endocrine: Negative for cold intolerance, heat intolerance, polydipsia, polyphagia and polyuria  Genitourinary: Negative for dysuria, flank pain, frequency, hematuria and urgency  Musculoskeletal: Negative for arthralgias, back pain, myalgias and neck pain  Skin: Negative for color change and pallor  Allergic/Immunologic: Negative for environmental allergies and food allergies  Neurological: Negative for dizziness, weakness, light-headedness, numbness and headaches  Hematological: Negative for adenopathy  Does not bruise/bleed easily  Psychiatric/Behavioral: Negative for behavioral problems, sleep disturbance and suicidal ideas  The patient is not nervous/anxious  Objective:   Physical Exam   Constitutional: She is oriented to person, place, and time  She appears well-developed and well-nourished  HENT:   Head: Normocephalic and atraumatic  Right Ear: External ear normal    Left Ear: External ear normal    Nose: Nose normal    Mouth/Throat: Posterior oropharyngeal edema present  No oropharyngeal exudate  Eyes: Pupils are equal, round, and reactive to light  Conjunctivae and EOM are normal  Right eye exhibits no discharge  Left eye exhibits no discharge  No scleral icterus  Neck: Normal range of motion  Neck supple  No tracheal deviation present  No thyromegaly present  Cardiovascular: Normal rate, regular rhythm and normal heart sounds  No murmur heard  Pulmonary/Chest: Effort normal and breath sounds normal  No respiratory distress  She has no wheezes  She has no rales     Abdominal: Soft  Bowel sounds are normal  She exhibits no distension and no mass  There is no tenderness  There is no rebound  Musculoskeletal: Normal range of motion  She exhibits no edema  Lymphadenopathy:     She has no cervical adenopathy  Neurological: She is alert and oriented to person, place, and time  She has normal reflexes  No cranial nerve deficit  Skin: Skin is warm  No rash noted  No erythema  No pallor  Psychiatric: She has a normal mood and affect  Her behavior is normal  Judgment and thought content normal    Nursing note and vitals reviewed           Past Surgical History:   Procedure Laterality Date    APPENDECTOMY      TOTAL ABDOMINAL HYSTERECTOMY         Family History   Problem Relation Age of Onset    Diabetes Mother     Hyperlipidemia Father     No Known Problems Son     Diabetes Maternal Grandmother          Current Outpatient Prescriptions:     atorvastatin (LIPITOR) 10 mg tablet, Take 1 tablet (10 mg total) by mouth daily, Disp: 90 tablet, Rfl: 1    fluticasone (FLONASE) 50 mcg/act nasal spray, 2 sprays into each nostril daily, Disp: 16 g, Rfl: 3    glucose blood (ACCU-CHEK ACTIVE STRIPS) test strip, 1 Squirt by In Vitro route 2 (two) times a day, Disp: , Rfl:     montelukast (SINGULAIR) 10 mg tablet, Take 1 tablet (10 mg total) by mouth daily at bedtime, Disp: 90 tablet, Rfl: 1    Multiple Vitamins-Minerals (MULTIVITAMIN GUMMIES ADULT PO), Take 1 tablet by mouth daily, Disp: , Rfl:     omeprazole (PriLOSEC) 20 mg delayed release capsule, Take 1 capsule (20 mg total) by mouth daily, Disp: 90 capsule, Rfl: 0    sitaGLIPtin-metFORMIN (JANUMET)  MG per tablet, Take 1 tablet by mouth daily, Disp: 90 tablet, Rfl: 1    azithromycin (ZITHROMAX) 250 mg tablet, Take 2 tablets today then 1 tablet daily x 4 days, Disp: 6 tablet, Rfl: 0    ONE TOUCH LANCETS MISC, OneTouch Lancets Miscellaneous check blood sugar 1-2 times daily , Dx 250  Quantity: 1;  Refills: 5   Judy Carla TREVIÑO ; Start 8-July-2014 Active 200 Miscellaneous Box, Disp: , Rfl:     promethazine-dextromethorphan (PHENERGAN-DM) 6 25-15 mg/5 mL oral syrup, Take 5 mL by mouth 4 (four) times a day as needed for cough, Disp: 118 mL, Rfl: 0    Allergies   Allergen Reactions    Acetaminophen-Codeine      Reaction Date: 28Sep2007; TINGLING    Tylenol [Acetaminophen]     Latex Rash       Vitals:    01/31/19 1454   BP: 116/72   Pulse: 90   Temp: 98 6 °F (37 °C)   SpO2: 96%   Weight: 88 kg (194 lb)   Height: 5' 5" (1 651 m)

## 2019-01-31 NOTE — ASSESSMENT & PLAN NOTE
She has most likely viral URI, her symptoms are not improving in 1 week, will start her on Zithromax and promethazine DM for cough and congestion and advised to not to stop Flonase and Singulair she will continue that for her now on seasonal allergies

## 2019-02-13 DIAGNOSIS — K21.9 GERD WITHOUT ESOPHAGITIS: ICD-10-CM

## 2019-02-13 DIAGNOSIS — E78.2 MIXED HYPERLIPIDEMIA: ICD-10-CM

## 2019-02-13 DIAGNOSIS — J30.89 NON-SEASONAL ALLERGIC RHINITIS, UNSPECIFIED TRIGGER: ICD-10-CM

## 2019-02-13 NOTE — TELEPHONE ENCOUNTER
PATIENT STATES SHE NEVER RECEIVED THE MEDS SEND IN December BECAUSE THE MAIL ORDER IT WAS SENT TO IS NOT COVERED UNDER HER INSURANCE    SHE NOW USES [facility] [address]

## 2019-02-14 RX ORDER — ATORVASTATIN CALCIUM 10 MG/1
10 TABLET, FILM COATED ORAL DAILY
Qty: 90 TABLET | Refills: 1 | Status: SHIPPED | OUTPATIENT
Start: 2019-02-14 | End: 2019-08-18 | Stop reason: SDUPTHER

## 2019-02-14 RX ORDER — MONTELUKAST SODIUM 10 MG/1
10 TABLET ORAL
Qty: 90 TABLET | Refills: 1 | Status: SHIPPED | OUTPATIENT
Start: 2019-02-14 | End: 2019-08-18 | Stop reason: SDUPTHER

## 2019-02-14 RX ORDER — OMEPRAZOLE 20 MG/1
20 CAPSULE, DELAYED RELEASE ORAL DAILY
Qty: 90 CAPSULE | Refills: 1 | Status: SHIPPED | OUTPATIENT
Start: 2019-02-14 | End: 2019-08-05 | Stop reason: SDUPTHER

## 2019-03-13 ENCOUNTER — APPOINTMENT (OUTPATIENT)
Dept: LAB | Facility: CLINIC | Age: 44
End: 2019-03-13
Payer: COMMERCIAL

## 2019-03-13 DIAGNOSIS — E78.2 MIXED HYPERLIPIDEMIA: ICD-10-CM

## 2019-03-13 DIAGNOSIS — E11.9 TYPE 2 DIABETES MELLITUS WITHOUT COMPLICATION, WITHOUT LONG-TERM CURRENT USE OF INSULIN (HCC): ICD-10-CM

## 2019-03-13 LAB
ALBUMIN SERPL BCP-MCNC: 3.1 G/DL (ref 3.5–5)
ALP SERPL-CCNC: 110 U/L (ref 46–116)
ALT SERPL W P-5'-P-CCNC: 46 U/L (ref 12–78)
ANION GAP SERPL CALCULATED.3IONS-SCNC: 10 MMOL/L (ref 4–13)
AST SERPL W P-5'-P-CCNC: 19 U/L (ref 5–45)
BASOPHILS # BLD AUTO: 0.02 THOUSANDS/ΜL (ref 0–0.1)
BASOPHILS NFR BLD AUTO: 0 % (ref 0–1)
BILIRUB SERPL-MCNC: 0.3 MG/DL (ref 0.2–1)
BUN SERPL-MCNC: 6 MG/DL (ref 5–25)
CALCIUM SERPL-MCNC: 8.8 MG/DL (ref 8.3–10.1)
CHLORIDE SERPL-SCNC: 104 MMOL/L (ref 100–108)
CHOLEST SERPL-MCNC: 182 MG/DL (ref 50–200)
CO2 SERPL-SCNC: 28 MMOL/L (ref 21–32)
CREAT SERPL-MCNC: 0.67 MG/DL (ref 0.6–1.3)
EOSINOPHIL # BLD AUTO: 0.15 THOUSAND/ΜL (ref 0–0.61)
EOSINOPHIL NFR BLD AUTO: 2 % (ref 0–6)
ERYTHROCYTE [DISTWIDTH] IN BLOOD BY AUTOMATED COUNT: 14 % (ref 11.6–15.1)
EST. AVERAGE GLUCOSE BLD GHB EST-MCNC: 140 MG/DL
GFR SERPL CREATININE-BSD FRML MDRD: 106 ML/MIN/1.73SQ M
GLUCOSE P FAST SERPL-MCNC: 114 MG/DL (ref 65–99)
HBA1C MFR BLD: 6.5 % (ref 4.2–6.3)
HCT VFR BLD AUTO: 35.5 % (ref 34.8–46.1)
HDLC SERPL-MCNC: 48 MG/DL (ref 40–60)
HGB BLD-MCNC: 11.1 G/DL (ref 11.5–15.4)
IMM GRANULOCYTES # BLD AUTO: 0.01 THOUSAND/UL (ref 0–0.2)
IMM GRANULOCYTES NFR BLD AUTO: 0 % (ref 0–2)
LDLC SERPL CALC-MCNC: 106 MG/DL (ref 0–100)
LYMPHOCYTES # BLD AUTO: 2.66 THOUSANDS/ΜL (ref 0.6–4.47)
LYMPHOCYTES NFR BLD AUTO: 36 % (ref 14–44)
MCH RBC QN AUTO: 25.3 PG (ref 26.8–34.3)
MCHC RBC AUTO-ENTMCNC: 31.3 G/DL (ref 31.4–37.4)
MCV RBC AUTO: 81 FL (ref 82–98)
MONOCYTES # BLD AUTO: 0.38 THOUSAND/ΜL (ref 0.17–1.22)
MONOCYTES NFR BLD AUTO: 5 % (ref 4–12)
NEUTROPHILS # BLD AUTO: 4.12 THOUSANDS/ΜL (ref 1.85–7.62)
NEUTS SEG NFR BLD AUTO: 57 % (ref 43–75)
NONHDLC SERPL-MCNC: 134 MG/DL
NRBC BLD AUTO-RTO: 0 /100 WBCS
PLATELET # BLD AUTO: 238 THOUSANDS/UL (ref 149–390)
PMV BLD AUTO: 9.7 FL (ref 8.9–12.7)
POTASSIUM SERPL-SCNC: 3.8 MMOL/L (ref 3.5–5.3)
PROT SERPL-MCNC: 6.8 G/DL (ref 6.4–8.2)
RBC # BLD AUTO: 4.38 MILLION/UL (ref 3.81–5.12)
SODIUM SERPL-SCNC: 142 MMOL/L (ref 136–145)
TRIGL SERPL-MCNC: 139 MG/DL
TSH SERPL DL<=0.05 MIU/L-ACNC: 3.63 UIU/ML (ref 0.36–3.74)
WBC # BLD AUTO: 7.34 THOUSAND/UL (ref 4.31–10.16)

## 2019-03-13 PROCEDURE — 83036 HEMOGLOBIN GLYCOSYLATED A1C: CPT

## 2019-03-13 PROCEDURE — 85025 COMPLETE CBC W/AUTO DIFF WBC: CPT

## 2019-03-13 PROCEDURE — 80061 LIPID PANEL: CPT

## 2019-03-13 PROCEDURE — 36415 COLL VENOUS BLD VENIPUNCTURE: CPT

## 2019-03-13 PROCEDURE — 84443 ASSAY THYROID STIM HORMONE: CPT

## 2019-03-13 PROCEDURE — 80053 COMPREHEN METABOLIC PANEL: CPT

## 2019-03-14 ENCOUNTER — OFFICE VISIT (OUTPATIENT)
Dept: FAMILY MEDICINE CLINIC | Facility: CLINIC | Age: 44
End: 2019-03-14
Payer: COMMERCIAL

## 2019-03-14 VITALS
BODY MASS INDEX: 32.32 KG/M2 | HEIGHT: 65 IN | RESPIRATION RATE: 16 BRPM | SYSTOLIC BLOOD PRESSURE: 120 MMHG | WEIGHT: 194 LBS | DIASTOLIC BLOOD PRESSURE: 72 MMHG | HEART RATE: 84 BPM | OXYGEN SATURATION: 98 %

## 2019-03-14 DIAGNOSIS — R07.0 THROAT PAIN: ICD-10-CM

## 2019-03-14 DIAGNOSIS — J30.89 NON-SEASONAL ALLERGIC RHINITIS, UNSPECIFIED TRIGGER: ICD-10-CM

## 2019-03-14 DIAGNOSIS — K21.9 GERD WITHOUT ESOPHAGITIS: ICD-10-CM

## 2019-03-14 DIAGNOSIS — D64.9 ANEMIA, UNSPECIFIED TYPE: ICD-10-CM

## 2019-03-14 DIAGNOSIS — D50.9 IRON DEFICIENCY ANEMIA, UNSPECIFIED IRON DEFICIENCY ANEMIA TYPE: ICD-10-CM

## 2019-03-14 DIAGNOSIS — E11.9 TYPE 2 DIABETES MELLITUS WITHOUT COMPLICATION, WITHOUT LONG-TERM CURRENT USE OF INSULIN (HCC): Primary | ICD-10-CM

## 2019-03-14 PROBLEM — K59.03 DRUG-INDUCED CONSTIPATION: Status: RESOLVED | Noted: 2018-12-03 | Resolved: 2019-03-14

## 2019-03-14 PROCEDURE — 99214 OFFICE O/P EST MOD 30 MIN: CPT | Performed by: FAMILY MEDICINE

## 2019-03-14 RX ORDER — RANITIDINE 150 MG/1
150 TABLET ORAL
Qty: 90 TABLET | Refills: 0 | Status: SHIPPED | OUTPATIENT
Start: 2019-03-14 | End: 2019-06-24 | Stop reason: ALTCHOICE

## 2019-03-14 NOTE — ASSESSMENT & PLAN NOTE
Lab Results   Component Value Date    HGBA1C 6 5 (H) 03/13/2019     Diabetes is well controlled she will continue same medication    And follow-up in 4 months  Continue diabetic diet exercise and lose more weight

## 2019-03-14 NOTE — ASSESSMENT & PLAN NOTE
She has slightly low hemoglobin and she does not want to take iron and his it cause constipation she says she will try to work more on her diet

## 2019-03-14 NOTE — PROGRESS NOTES
Assessment/Plan:    Problem List Items Addressed This Visit        Digestive    GERD without esophagitis    Relevant Medications    ranitidine (ZANTAC) 150 mg tablet       Endocrine    Type 2 diabetes mellitus ([facility] [address] ) - Primary     Lab Results   Component Value Date    HGBA1C 6 5 (H) 03/13/2019     Diabetes is well controlled she will continue same medication  And follow-up in 4 months  Continue diabetic diet exercise and lose more weight         Relevant Medications    sitaGLIPtin-metFORMIN (JANUMET)  MG per tablet    Other Relevant Orders    CBC and differential    Comprehensive metabolic panel    Hemoglobin A1C    Lipid panel       Respiratory    Non-seasonal allergic rhinitis     Continue Singulair she can use either Claritin at nighttime or she will use Flonase with the Singulair to control her postnasal drip and allergies            Other    Anemia    Iron deficiency anemia     She has slightly low hemoglobin and she does not want to take iron and his it cause constipation she says she will try to work more on her diet         Throat pain     Mild throat pain with some inflammation and postnasal drip, most likely is from wire or allergies, advised to take Flonase to improve symptoms and she can do gargling with salt water               Chief Complaint   Patient presents with    Follow-up     review labs       Subjective:   Patient ID: Radha Torres is a 53 y o  female  She is here follow-up on her labs, she is diabetic, takes her medication regularly  And takes worse statins for hyperlipidemia, she has seasonal or non seasonal allergies  She takes Singulair regularly but she says recently she has not use Flonase,  She complain of having some scratchiness and throat pain and lot of mucus going in the throat in the nighttime, denies fever chills runny nose or watery eyes  She tries to eat healthy   denies any chest pain or shortness of breath  She says her GERD is mostly stable she takes Prilosec in the morning but at nighttime whatever she eats she started feeling more belching burping and she feels her symptoms are due to acid reflux he does not want to go for endoscopy      Review of Systems   Constitutional: Negative for activity change, appetite change, chills, diaphoresis, fatigue, fever and unexpected weight change  HENT: Negative for congestion, dental problem, ear discharge, ear pain, facial swelling, hearing loss, mouth sores, nosebleeds, postnasal drip, rhinorrhea, sinus pressure, sinus pain, sneezing, sore throat, trouble swallowing and voice change  Eyes: Negative for photophobia, pain, discharge, redness and itching  Respiratory: Negative for cough, chest tightness, shortness of breath and wheezing  Cardiovascular: Negative for chest pain, palpitations and leg swelling  Gastrointestinal: Negative for abdominal distention, abdominal pain, blood in stool, constipation, diarrhea and nausea  Endocrine: Negative for cold intolerance, heat intolerance, polydipsia, polyphagia and polyuria  Genitourinary: Negative for dysuria, flank pain, frequency, hematuria and urgency  Musculoskeletal: Negative for arthralgias, back pain, myalgias and neck pain  Skin: Negative for color change and pallor  Allergic/Immunologic: Negative for environmental allergies and food allergies  Neurological: Negative for dizziness, weakness, light-headedness, numbness and headaches  Hematological: Negative for adenopathy  Does not bruise/bleed easily  Psychiatric/Behavioral: Negative for behavioral problems, sleep disturbance and suicidal ideas  The patient is not nervous/anxious  Objective:  Physical Exam   Constitutional: She is oriented to person, place, and time  She appears well-developed and well-nourished  HENT:   Head: Normocephalic and atraumatic  Nose: Nose normal    Mouth/Throat: No oropharyngeal exudate     Excessive cerumen bilaterally ear  Slight hyperemia in the throat Eyes: Pupils are equal, round, and reactive to light  Conjunctivae and EOM are normal  Right eye exhibits no discharge  Left eye exhibits no discharge  No scleral icterus  Neck: Normal range of motion  Neck supple  No tracheal deviation present  No thyromegaly present  Cardiovascular: Normal rate, regular rhythm and normal heart sounds  No murmur heard  Pulmonary/Chest: Effort normal and breath sounds normal  No respiratory distress  She has no wheezes  She has no rales  Abdominal: Soft  Bowel sounds are normal  She exhibits no distension and no mass  There is no tenderness  There is no rebound  Musculoskeletal: Normal range of motion  She exhibits no edema  Lymphadenopathy:     She has no cervical adenopathy  Neurological: She is alert and oriented to person, place, and time  She has normal reflexes  No cranial nerve deficit  Skin: Skin is warm  No rash noted  No erythema  No pallor  Psychiatric: She has a normal mood and affect  Her behavior is normal  Judgment and thought content normal    Nursing note and vitals reviewed           Past Surgical History:   Procedure Laterality Date    APPENDECTOMY      TOTAL ABDOMINAL HYSTERECTOMY         Family History   Problem Relation Age of Onset    Diabetes Mother     Hyperlipidemia Father     No Known Problems Son     Diabetes Maternal Grandmother          Current Outpatient Medications:     atorvastatin (LIPITOR) 10 mg tablet, Take 1 tablet (10 mg total) by mouth daily, Disp: 90 tablet, Rfl: 1    fluticasone (FLONASE) 50 mcg/act nasal spray, 2 sprays into each nostril daily, Disp: 16 g, Rfl: 3    glucose blood (ACCU-CHEK ACTIVE STRIPS) test strip, 1 Squirt by In Vitro route 2 (two) times a day, Disp: , Rfl:     montelukast (SINGULAIR) 10 mg tablet, Take 1 tablet (10 mg total) by mouth daily at bedtime, Disp: 90 tablet, Rfl: 1    Multiple Vitamins-Minerals (MULTIVITAMIN GUMMIES ADULT PO), Take 1 tablet by mouth daily, Disp: , Rfl:    omeprazole (PriLOSEC) 20 mg delayed release capsule, Take 1 capsule (20 mg total) by mouth daily, Disp: 90 capsule, Rfl: 1    ONE TOUCH LANCETS MISC, OneTouch Lancets Miscellaneous check blood sugar 1-2 times daily , Dx 250  Quantity: 1;  Refills: 5   Judyshelly JUAN Bowlegs ;  Start 8-July-2014 Active 200 Miscellaneous Box, Disp: , Rfl:     sitaGLIPtin-metFORMIN (JANUMET)  MG per tablet, Take 1 tablet by mouth daily, Disp: 90 tablet, Rfl: 1    ranitidine (ZANTAC) 150 mg tablet, Take 1 tablet (150 mg total) by mouth daily at bedtime, Disp: 90 tablet, Rfl: 0    Allergies   Allergen Reactions    Acetaminophen-Codeine      Reaction Date: 28Sep2007; TINGLING    Tylenol [Acetaminophen]     Latex Rash       Vitals:    03/14/19 1104   BP: 120/72   Pulse: 84   Resp: 16   SpO2: 98%   Weight: 88 kg (194 lb)   Height: 5' 5" (1 651 m)

## 2019-03-14 NOTE — ASSESSMENT & PLAN NOTE
Mild throat pain with some inflammation and postnasal drip, most likely is from wire or allergies, advised to take Flonase to improve symptoms and she can do gargling with salt water

## 2019-03-14 NOTE — ASSESSMENT & PLAN NOTE
Continue Singulair she can use either Claritin at nighttime or she will use Flonase with the Singulair to control her postnasal drip and allergies

## 2019-06-24 ENCOUNTER — OFFICE VISIT (OUTPATIENT)
Dept: FAMILY MEDICINE CLINIC | Facility: CLINIC | Age: 44
End: 2019-06-24
Payer: COMMERCIAL

## 2019-06-24 VITALS
TEMPERATURE: 98.2 F | HEART RATE: 80 BPM | SYSTOLIC BLOOD PRESSURE: 118 MMHG | HEIGHT: 65 IN | WEIGHT: 198 LBS | DIASTOLIC BLOOD PRESSURE: 74 MMHG | OXYGEN SATURATION: 97 % | RESPIRATION RATE: 16 BRPM | BODY MASS INDEX: 32.99 KG/M2

## 2019-06-24 DIAGNOSIS — I73.00 RAYNAUD'S PHENOMENON WITHOUT GANGRENE: ICD-10-CM

## 2019-06-24 DIAGNOSIS — K21.9 GERD WITHOUT ESOPHAGITIS: ICD-10-CM

## 2019-06-24 DIAGNOSIS — M54.2 NECK PAIN: Primary | ICD-10-CM

## 2019-06-24 PROBLEM — N64.4 BREAST PAIN, RIGHT: Status: RESOLVED | Noted: 2018-10-03 | Resolved: 2019-06-24

## 2019-06-24 PROBLEM — D64.9 ANEMIA: Status: RESOLVED | Noted: 2018-08-27 | Resolved: 2019-06-24

## 2019-06-24 PROBLEM — R10.2 PELVIC PAIN IN FEMALE: Status: RESOLVED | Noted: 2018-10-03 | Resolved: 2019-06-24

## 2019-06-24 PROBLEM — R07.0 THROAT PAIN: Status: RESOLVED | Noted: 2019-03-14 | Resolved: 2019-06-24

## 2019-06-24 PROBLEM — J06.9 VIRAL UPPER RESPIRATORY TRACT INFECTION: Status: RESOLVED | Noted: 2019-01-31 | Resolved: 2019-06-24

## 2019-06-24 PROBLEM — Z23 NEED FOR INFLUENZA VACCINATION: Status: RESOLVED | Noted: 2018-10-03 | Resolved: 2019-06-24

## 2019-06-24 PROCEDURE — 1036F TOBACCO NON-USER: CPT | Performed by: FAMILY MEDICINE

## 2019-06-24 PROCEDURE — 99214 OFFICE O/P EST MOD 30 MIN: CPT | Performed by: FAMILY MEDICINE

## 2019-06-24 PROCEDURE — 3008F BODY MASS INDEX DOCD: CPT | Performed by: FAMILY MEDICINE

## 2019-06-24 RX ORDER — CYCLOBENZAPRINE HCL 10 MG
10 TABLET ORAL
Qty: 20 TABLET | Refills: 0 | Status: SHIPPED | OUTPATIENT
Start: 2019-06-24

## 2019-07-12 ENCOUNTER — APPOINTMENT (OUTPATIENT)
Dept: LAB | Facility: CLINIC | Age: 44
End: 2019-07-12
Payer: COMMERCIAL

## 2019-07-12 DIAGNOSIS — E11.9 TYPE 2 DIABETES MELLITUS WITHOUT COMPLICATION, WITHOUT LONG-TERM CURRENT USE OF INSULIN (HCC): ICD-10-CM

## 2019-07-12 LAB
ALBUMIN SERPL BCP-MCNC: 3.5 G/DL (ref 3.5–5)
ALP SERPL-CCNC: 111 U/L (ref 46–116)
ALT SERPL W P-5'-P-CCNC: 31 U/L (ref 12–78)
ANION GAP SERPL CALCULATED.3IONS-SCNC: 8 MMOL/L (ref 4–13)
AST SERPL W P-5'-P-CCNC: 19 U/L (ref 5–45)
BASOPHILS # BLD AUTO: 0.02 THOUSANDS/ΜL (ref 0–0.1)
BASOPHILS NFR BLD AUTO: 0 % (ref 0–1)
BILIRUB SERPL-MCNC: 0.5 MG/DL (ref 0.2–1)
BUN SERPL-MCNC: 11 MG/DL (ref 5–25)
CALCIUM SERPL-MCNC: 8.6 MG/DL (ref 8.3–10.1)
CHLORIDE SERPL-SCNC: 104 MMOL/L (ref 100–108)
CHOLEST SERPL-MCNC: 188 MG/DL (ref 50–200)
CO2 SERPL-SCNC: 28 MMOL/L (ref 21–32)
CREAT SERPL-MCNC: 0.69 MG/DL (ref 0.6–1.3)
EOSINOPHIL # BLD AUTO: 0.22 THOUSAND/ΜL (ref 0–0.61)
EOSINOPHIL NFR BLD AUTO: 3 % (ref 0–6)
ERYTHROCYTE [DISTWIDTH] IN BLOOD BY AUTOMATED COUNT: 13.5 % (ref 11.6–15.1)
EST. AVERAGE GLUCOSE BLD GHB EST-MCNC: 151 MG/DL
GFR SERPL CREATININE-BSD FRML MDRD: 105 ML/MIN/1.73SQ M
GLUCOSE P FAST SERPL-MCNC: 128 MG/DL (ref 65–99)
HBA1C MFR BLD: 6.9 % (ref 4.2–6.3)
HCT VFR BLD AUTO: 38.3 % (ref 34.8–46.1)
HDLC SERPL-MCNC: 44 MG/DL (ref 40–60)
HGB BLD-MCNC: 11.5 G/DL (ref 11.5–15.4)
IMM GRANULOCYTES # BLD AUTO: 0.01 THOUSAND/UL (ref 0–0.2)
IMM GRANULOCYTES NFR BLD AUTO: 0 % (ref 0–2)
LDLC SERPL CALC-MCNC: 104 MG/DL (ref 0–100)
LYMPHOCYTES # BLD AUTO: 2.39 THOUSANDS/ΜL (ref 0.6–4.47)
LYMPHOCYTES NFR BLD AUTO: 36 % (ref 14–44)
MCH RBC QN AUTO: 25.2 PG (ref 26.8–34.3)
MCHC RBC AUTO-ENTMCNC: 30 G/DL (ref 31.4–37.4)
MCV RBC AUTO: 84 FL (ref 82–98)
MONOCYTES # BLD AUTO: 0.42 THOUSAND/ΜL (ref 0.17–1.22)
MONOCYTES NFR BLD AUTO: 6 % (ref 4–12)
NEUTROPHILS # BLD AUTO: 3.52 THOUSANDS/ΜL (ref 1.85–7.62)
NEUTS SEG NFR BLD AUTO: 55 % (ref 43–75)
NONHDLC SERPL-MCNC: 144 MG/DL
NRBC BLD AUTO-RTO: 0 /100 WBCS
PLATELET # BLD AUTO: 244 THOUSANDS/UL (ref 149–390)
PMV BLD AUTO: 10.2 FL (ref 8.9–12.7)
POTASSIUM SERPL-SCNC: 3.6 MMOL/L (ref 3.5–5.3)
PROT SERPL-MCNC: 7.2 G/DL (ref 6.4–8.2)
RBC # BLD AUTO: 4.56 MILLION/UL (ref 3.81–5.12)
SODIUM SERPL-SCNC: 140 MMOL/L (ref 136–145)
TRIGL SERPL-MCNC: 202 MG/DL
WBC # BLD AUTO: 6.58 THOUSAND/UL (ref 4.31–10.16)

## 2019-07-12 PROCEDURE — 85025 COMPLETE CBC W/AUTO DIFF WBC: CPT

## 2019-07-12 PROCEDURE — 80053 COMPREHEN METABOLIC PANEL: CPT

## 2019-07-12 PROCEDURE — 80061 LIPID PANEL: CPT

## 2019-07-12 PROCEDURE — 83036 HEMOGLOBIN GLYCOSYLATED A1C: CPT

## 2019-07-12 PROCEDURE — 36415 COLL VENOUS BLD VENIPUNCTURE: CPT

## 2019-07-15 ENCOUNTER — OFFICE VISIT (OUTPATIENT)
Dept: FAMILY MEDICINE CLINIC | Facility: CLINIC | Age: 44
End: 2019-07-15
Payer: COMMERCIAL

## 2019-07-15 VITALS
OXYGEN SATURATION: 98 % | HEIGHT: 65 IN | WEIGHT: 194 LBS | DIASTOLIC BLOOD PRESSURE: 80 MMHG | RESPIRATION RATE: 16 BRPM | BODY MASS INDEX: 32.32 KG/M2 | SYSTOLIC BLOOD PRESSURE: 120 MMHG | HEART RATE: 76 BPM

## 2019-07-15 DIAGNOSIS — H61.22 IMPACTED CERUMEN OF LEFT EAR: Primary | ICD-10-CM

## 2019-07-15 DIAGNOSIS — E11.65 TYPE 2 DIABETES MELLITUS WITH HYPERGLYCEMIA, WITHOUT LONG-TERM CURRENT USE OF INSULIN (HCC): ICD-10-CM

## 2019-07-15 PROCEDURE — 1036F TOBACCO NON-USER: CPT | Performed by: FAMILY MEDICINE

## 2019-07-15 PROCEDURE — 99214 OFFICE O/P EST MOD 30 MIN: CPT | Performed by: FAMILY MEDICINE

## 2019-07-15 PROCEDURE — 69210 REMOVE IMPACTED EAR WAX UNI: CPT | Performed by: FAMILY MEDICINE

## 2019-07-15 NOTE — PROGRESS NOTES
Assessment/Plan:    Problem List Items Addressed This Visit        Endocrine    Type 2 diabetes mellitus ([address] )  Discussed about having high dose of medication but she is planning to move she does not want to change any medicine now       Nervous and Auditory    Impacted cerumen of left ear - Primary  Impaction of cerumen in the left ear and she has been feeling pressure, after discussion with her, her remove the wax using irrigation and care it, and she feels better after removal of the back          Chief Complaint   Patient presents with    Follow-up       Subjective:   Patient ID: Rob Becerra is a 53 y o  female  Ear cerumen removal  Date/Time: 7/15/2019 1:43 PM  Performed by: Julia Kan MD  Authorized by: Julia Kan MD     Patient location:  Clinic  Consent:     Consent obtained:  Verbal    Consent given by:  Patient    Risks discussed:  Bleeding, dizziness, pain, infection, incomplete removal and TM perforation  Universal protocol:     Patient identity confirmed:  Verbally with patient  Procedure details:     Location:  L ear    Procedure type: irrigation with insturmentation      Insturmentation: curette      Approach:  Natural orifice  Post-procedure details:     Complication:  None    Patient tolerance of procedure: Tolerated well, no immediate complications      Review of Systems   Constitutional: Negative for activity change, appetite change, chills, diaphoresis, fatigue, fever and unexpected weight change  HENT: Negative for congestion, dental problem, ear discharge, ear pain, facial swelling, hearing loss, mouth sores, nosebleeds, postnasal drip, rhinorrhea, sinus pressure, sinus pain, sneezing, sore throat, trouble swallowing and voice change  Left ear discomfort   Eyes: Negative for photophobia, pain, discharge, redness and itching  Respiratory: Negative for cough, chest tightness, shortness of breath and wheezing      Cardiovascular: Negative for chest pain, palpitations and leg swelling  Gastrointestinal: Negative for abdominal distention, abdominal pain, blood in stool, constipation, diarrhea and nausea  Endocrine: Negative for cold intolerance, heat intolerance, polydipsia, polyphagia and polyuria  Genitourinary: Negative for dysuria, flank pain, frequency, hematuria and urgency  Musculoskeletal: Negative for arthralgias, back pain, myalgias and neck pain  Skin: Negative for color change and pallor  Allergic/Immunologic: Negative for environmental allergies and food allergies  Neurological: Negative for dizziness, weakness, light-headedness, numbness and headaches  Hematological: Negative for adenopathy  Does not bruise/bleed easily  Psychiatric/Behavioral: Negative for behavioral problems, sleep disturbance and suicidal ideas  The patient is not nervous/anxious  Objective:  Physical Exam   Constitutional: She is oriented to person, place, and time  She appears well-developed and well-nourished  HENT:   Head: Normocephalic and atraumatic  Right Ear: External ear normal    Nose: Nose normal    Mouth/Throat: Oropharynx is clear and moist  No oropharyngeal exudate  Cerumen impaction left ear   Eyes: Pupils are equal, round, and reactive to light  Conjunctivae and EOM are normal  Right eye exhibits no discharge  Left eye exhibits no discharge  No scleral icterus  Neck: Normal range of motion  Neck supple  No tracheal deviation present  No thyromegaly present  Cardiovascular: Normal rate, regular rhythm and normal heart sounds  No murmur heard  Pulmonary/Chest: Effort normal and breath sounds normal  No respiratory distress  She has no wheezes  She has no rales  Abdominal: Soft  Bowel sounds are normal  She exhibits no distension and no mass  There is no tenderness  There is no rebound  Musculoskeletal: Normal range of motion  She exhibits no edema  Lymphadenopathy:     She has no cervical adenopathy     Neurological: She is alert and oriented to person, place, and time  She has normal reflexes  No cranial nerve deficit  Skin: Skin is warm  No rash noted  No erythema  No pallor  Psychiatric: She has a normal mood and affect  Her behavior is normal  Judgment and thought content normal    Nursing note and vitals reviewed           Past Surgical History:   Procedure Laterality Date    APPENDECTOMY      TOTAL ABDOMINAL HYSTERECTOMY         Family History   Problem Relation Age of Onset    Diabetes Mother     Hyperlipidemia Father     No Known Problems Son     Diabetes Maternal Grandmother          Current Outpatient Medications:     atorvastatin (LIPITOR) 10 mg tablet, Take 1 tablet (10 mg total) by mouth daily, Disp: 90 tablet, Rfl: 1    cyclobenzaprine (FLEXERIL) 10 mg tablet, Take 1 tablet (10 mg total) by mouth daily at bedtime, Disp: 20 tablet, Rfl: 0    fluticasone (FLONASE) 50 mcg/act nasal spray, 2 sprays into each nostril daily, Disp: 16 g, Rfl: 3    glucose blood (ACCU-CHEK ACTIVE STRIPS) test strip, 1 Squirt by In Vitro route 2 (two) times a day, Disp: , Rfl:     montelukast (SINGULAIR) 10 mg tablet, Take 1 tablet (10 mg total) by mouth daily at bedtime, Disp: 90 tablet, Rfl: 1    Multiple Vitamins-Minerals (MULTIVITAMIN GUMMIES ADULT PO), Take 1 tablet by mouth daily, Disp: , Rfl:     omeprazole (PriLOSEC) 20 mg delayed release capsule, Take 1 capsule (20 mg total) by mouth daily, Disp: 90 capsule, Rfl: 1    ONE TOUCH LANCETS MISC, OneTouch Lancets Miscellaneous check blood sugar 1-2 times daily , Dx 250  Quantity: 1;  Refills: 5   Judy M D , Bond ;  Start 8-July-2014 Active 200 Miscellaneous Box, Disp: , Rfl:     sitaGLIPtin-metFORMIN (JANUMET)  MG per tablet, Take 1 tablet by mouth daily, Disp: 90 tablet, Rfl: 1    Allergies   Allergen Reactions    Acetaminophen-Codeine      Reaction Date: 28Sep2007; TINGLING    Tylenol [Acetaminophen]     Latex Rash       Vitals:    07/15/19 1103   BP: 120/80   Pulse: 76   Resp: 16 SpO2: 98%   Weight: 88 kg (194 lb)   Height: 5' 5" (1 651 m)

## 2019-07-15 NOTE — ASSESSMENT & PLAN NOTE
Lab Results   Component Value Date    HGBA1C 6 9 (H) 07/12/2019       No results for input(s): POCGLU in the last 72 hours      Blood Sugar Average: Last 72 hrs:

## 2019-07-18 ENCOUNTER — OFFICE VISIT (OUTPATIENT)
Dept: FAMILY MEDICINE CLINIC | Facility: CLINIC | Age: 44
End: 2019-07-18
Payer: COMMERCIAL

## 2019-07-18 VITALS
WEIGHT: 194 LBS | BODY MASS INDEX: 32.32 KG/M2 | HEART RATE: 103 BPM | SYSTOLIC BLOOD PRESSURE: 120 MMHG | RESPIRATION RATE: 16 BRPM | TEMPERATURE: 98 F | DIASTOLIC BLOOD PRESSURE: 80 MMHG | HEIGHT: 65 IN | OXYGEN SATURATION: 98 %

## 2019-07-18 DIAGNOSIS — E11.65 TYPE 2 DIABETES MELLITUS WITH HYPERGLYCEMIA, WITHOUT LONG-TERM CURRENT USE OF INSULIN (HCC): ICD-10-CM

## 2019-07-18 DIAGNOSIS — L03.811 CELLULITIS OF HEAD OR SCALP: Primary | ICD-10-CM

## 2019-07-18 DIAGNOSIS — L29.9 ITCHING: ICD-10-CM
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
## 2019-07-18 PROBLEM — H61.22 IMPACTED CERUMEN OF LEFT EAR: Status: RESOLVED | Noted: 2019-07-15 | Resolved: 2019-07-18

## 2019-07-18 PROCEDURE — 99214 OFFICE O/P EST MOD 30 MIN: CPT | Performed by: FAMILY MEDICINE

## 2019-07-18 PROCEDURE — 3008F BODY MASS INDEX DOCD: CPT | Performed by: FAMILY MEDICINE

## 2019-07-18 RX ORDER — AMOXICILLIN AND CLAVULANATE POTASSIUM 875; 125 MG/1; MG/1
1 TABLET, FILM COATED ORAL EVERY 12 HOURS SCHEDULED
Qty: 20 TABLET | Refills: 0 | Status: SHIPPED | OUTPATIENT
Start: 2019-07-18 | End: 2019-07-28

## 2019-07-18 NOTE — ASSESSMENT & PLAN NOTE
Frequent itching, discussed with her that she the might have some allergies to certain medication, she should try to stop 1 medicine at a time and see which medicine can cause allergy if she does not find improvement then probably she does not have an allergy to the medication, it could be dry skin, advised to use good moisturizer

## 2019-07-18 NOTE — PROGRESS NOTES
Assessment/Plan:    Problem List Items Addressed This Visit        Endocrine    Type 2 diabetes mellitus ([facility] [address] )     Lab Results   Component Value Date    HGBA1C 6 9 (H) 07/12/2019     Diabetes is stable, she gets frequent skin problems and itching and redness currently she has redness on the scalp and pain, will treat her with Augmentin for cellulitis of the scalp  No results for input(s): POCGLU in the last 72 hours  Blood Sugar Average: Last 72 hrs:              Musculoskeletal and Integument    Cellulitis of head or scalp - Primary     Start Augmentin, follow-up if not better         Relevant Medications    amoxicillin-clavulanate (AUGMENTIN) 875-125 mg per tablet    Mosquito bite     Basket of bite, advised to use hydrocortisone cream            Other    Itching     Frequent itching, discussed with her that she the might have some allergies to certain medication, she should try to stop 1 medicine at a time and see which medicine can cause allergy if she does not find improvement then probably she does not have an allergy to the medication, it could be dry skin, advised to use good moisturizer               Chief Complaint   Patient presents with    Headache       Subjective:   Patient ID: Judy Pradhan is a 53 y o  female  She complains of pain and redness in the top of the scalp and headache since yesterday, she says she keeps getting itching on her scalp and on her body intermittently most of the time she does not know whether is some medicine causing waiting, she takes Singulair regularly, she is on Lipitor and she is on an [place]  She also takes omeprazole, she says her skin is very sensitive, yesterday she also got bit by a mosquito dose while she was sitting in the backyard and she has some itching and redness around dose spots    She denies any fever chills, she says she took ibuprofen without relief of her headache and pain in the top of the scalp      Review of Systems   Constitutional: Negative for activity change, appetite change, chills, diaphoresis, fatigue, fever and unexpected weight change  HENT: Negative for congestion, dental problem, ear discharge, ear pain, facial swelling, hearing loss, mouth sores, nosebleeds, postnasal drip, rhinorrhea, sinus pressure, sinus pain, sneezing, sore throat, trouble swallowing and voice change  Eyes: Negative for photophobia, pain, discharge, redness and itching  Respiratory: Negative for cough, chest tightness, shortness of breath and wheezing  Cardiovascular: Negative for chest pain, palpitations and leg swelling  Gastrointestinal: Negative for abdominal distention, abdominal pain, blood in stool, constipation, diarrhea and nausea  Endocrine: Negative for cold intolerance, heat intolerance, polydipsia, polyphagia and polyuria  Genitourinary: Negative for dysuria, flank pain, frequency, hematuria and urgency  Musculoskeletal: Negative for arthralgias, back pain, myalgias and neck pain  Skin: Negative for color change and pallor  Allergic/Immunologic: Negative for environmental allergies and food allergies  Neurological: Negative for dizziness, weakness, light-headedness, numbness and headaches  Hematological: Negative for adenopathy  Does not bruise/bleed easily  Psychiatric/Behavioral: Negative for behavioral problems, sleep disturbance and suicidal ideas  The patient is not nervous/anxious  Objective:  Physical Exam   Constitutional: She appears well-developed and well-nourished  HENT:   Head: Normocephalic and atraumatic  Mouth/Throat: Oropharynx is clear and moist    Eyes: Pupils are equal, round, and reactive to light  Conjunctivae and EOM are normal  No scleral icterus  Neck: Normal range of motion  Neck supple  No thyromegaly present  Cardiovascular: Normal rate, regular rhythm and normal heart sounds  Pulmonary/Chest: Effort normal and breath sounds normal  She has no wheezes  She has no rales  Lymphadenopathy:     She has no cervical adenopathy  Neurological: She is alert  Skin: No rash noted  No erythema  Redness and tenderness on the top of the scalp about 4 into 4 cm area   Psychiatric: She has a normal mood and affect  Nursing note and vitals reviewed           Past Surgical History:   Procedure Laterality Date    APPENDECTOMY      TOTAL ABDOMINAL HYSTERECTOMY         Family History   Problem Relation Age of Onset    Diabetes Mother     Hyperlipidemia Father     No Known Problems Son     Diabetes Maternal Grandmother          Current Outpatient Medications:     atorvastatin (LIPITOR) 10 mg tablet, Take 1 tablet (10 mg total) by mouth daily, Disp: 90 tablet, Rfl: 1    cyclobenzaprine (FLEXERIL) 10 mg tablet, Take 1 tablet (10 mg total) by mouth daily at bedtime, Disp: 20 tablet, Rfl: 0    fluticasone (FLONASE) 50 mcg/act nasal spray, 2 sprays into each nostril daily, Disp: 16 g, Rfl: 3    glucose blood (ACCU-CHEK ACTIVE STRIPS) test strip, 1 Squirt by In Vitro route 2 (two) times a day, Disp: , Rfl:     montelukast (SINGULAIR) 10 mg tablet, Take 1 tablet (10 mg total) by mouth daily at bedtime, Disp: 90 tablet, Rfl: 1    Multiple Vitamins-Minerals (MULTIVITAMIN GUMMIES ADULT PO), Take 1 tablet by mouth daily, Disp: , Rfl:     omeprazole (PriLOSEC) 20 mg delayed release capsule, Take 1 capsule (20 mg total) by mouth daily, Disp: 90 capsule, Rfl: 1    ONE TOUCH LANCETS MISC, OneTouch Lancets Miscellaneous check blood sugar 1-2 times daily , Dx 250  Quantity: 1;  Refills: 5   Judy M D , Jackson ;  Start 8-July-2014 Active 200 Miscellaneous Box, Disp: , Rfl:     sitaGLIPtin-metFORMIN (JANUMET)  MG per tablet, Take 1 tablet by mouth daily, Disp: 90 tablet, Rfl: 1    amoxicillin-clavulanate (AUGMENTIN) 875-125 mg per tablet, Take 1 tablet by mouth every 12 (twelve) hours for 10 days, Disp: 20 tablet, Rfl: 0    Allergies   Allergen Reactions    Acetaminophen-Codeine Reaction Date: [id number]; TINGLING    Tylenol [Acetaminophen]     Latex Rash       Vitals:    07/18/19 1041   BP: 120/80   Pulse: 103   Resp: 16   Temp: 98 °F (36 7 °C)   SpO2: 98%   Weight: 88 kg (194 lb)   Height: 5' 5" (1 651 m)

## 2019-07-18 NOTE — ASSESSMENT & PLAN NOTE
Lab Results   Component Value Date    HGBA1C 6 9 (H) 07/12/2019     Diabetes is stable, she gets frequent skin problems and itching and redness currently she has redness on the scalp and pain, will treat her with Augmentin for cellulitis of the scalp  No results for input(s): POCGLU in the last 72 hours      Blood Sugar Average: Last 72 hrs:

## 2019-07-29 ENCOUNTER — TELEPHONE (OUTPATIENT)
Dept: FAMILY MEDICINE CLINIC | Facility: CLINIC | Age: 44
End: 2019-07-29

## 2019-07-29 DIAGNOSIS — J30.89 NON-SEASONAL ALLERGIC RHINITIS, UNSPECIFIED TRIGGER: ICD-10-CM

## 2019-07-29 DIAGNOSIS — B37.3 YEAST INFECTION OF THE VAGINA: Primary | ICD-10-CM

## 2019-07-29 RX ORDER — FLUCONAZOLE 150 MG/1
150 TABLET ORAL ONCE
Qty: 2 TABLET | Refills: 0 | Status: SHIPPED | OUTPATIENT
Start: 2019-07-29 | End: 2019-07-29

## 2019-07-29 NOTE — TELEPHONE ENCOUNTER
June Cabrera has been taking augmentin since 7/18  Now states she believes she has yeast infection    Glendy spoke with Chas Bullard and he will send over medication to [facility]

## 2019-07-30 RX ORDER — FLUTICASONE PROPIONATE 50 MCG
2 SPRAY, SUSPENSION (ML) NASAL DAILY
Qty: 16 G | Refills: 4 | Status: SHIPPED | OUTPATIENT
Start: 2019-07-30

## 2019-08-05 DIAGNOSIS — K21.9 GERD WITHOUT ESOPHAGITIS: ICD-10-CM

## 2019-08-05 RX ORDER — OMEPRAZOLE 20 MG/1
CAPSULE, DELAYED RELEASE ORAL
Qty: 90 CAPSULE | Refills: 1 | Status: SHIPPED | OUTPATIENT
Start: 2019-08-05 | End: 2019-10-23 | Stop reason: SDUPTHER

## 2019-08-18 DIAGNOSIS — J30.89 NON-SEASONAL ALLERGIC RHINITIS, UNSPECIFIED TRIGGER: ICD-10-CM

## 2019-08-18 DIAGNOSIS — E78.2 MIXED HYPERLIPIDEMIA: ICD-10-CM

## 2019-08-19 RX ORDER — MONTELUKAST SODIUM 10 MG/1
10 TABLET ORAL
Qty: 90 TABLET | Refills: 1 | Status: SHIPPED | OUTPATIENT
Start: 2019-08-19 | End: 2019-10-23 | Stop reason: SDUPTHER

## 2019-08-19 RX ORDER — ATORVASTATIN CALCIUM 10 MG/1
TABLET, FILM COATED ORAL
Qty: 90 TABLET | Refills: 1 | Status: SHIPPED | OUTPATIENT
Start: 2019-08-19 | End: 2020-02-19

## 2019-09-15 DIAGNOSIS — E11.9 TYPE 2 DIABETES MELLITUS WITHOUT COMPLICATION, WITHOUT LONG-TERM CURRENT USE OF INSULIN (HCC): ICD-10-CM

## 2019-09-15 RX ORDER — SITAGLIPTIN AND METFORMIN HYDROCHLORIDE 500; 50 MG/1; MG/1
TABLET, FILM COATED ORAL
Qty: 90 TABLET | Refills: 1 | Status: SHIPPED | OUTPATIENT
Start: 2019-09-15

## 2019-09-19 ENCOUNTER — TELEPHONE (OUTPATIENT)
Dept: FAMILY MEDICINE CLINIC | Facility: CLINIC | Age: 44
End: 2019-09-19

## 2019-09-19 NOTE — TELEPHONE ENCOUNTER
S/W patient's  and patient,  and child moved out to [place] last week  We are no longer PCP  They will be establishing with a new PCP local to their home

## 2019-10-23 DIAGNOSIS — K21.9 GERD WITHOUT ESOPHAGITIS: ICD-10-CM

## 2019-10-23 DIAGNOSIS — J30.89 NON-SEASONAL ALLERGIC RHINITIS, UNSPECIFIED TRIGGER: ICD-10-CM

## 2019-10-23 RX ORDER — MONTELUKAST SODIUM 10 MG/1
10 TABLET ORAL
Qty: 90 TABLET | Refills: 0 | Status: SHIPPED | OUTPATIENT
Start: 2019-10-23

## 2019-10-23 RX ORDER — OMEPRAZOLE 20 MG/1
20 CAPSULE, DELAYED RELEASE ORAL DAILY
Qty: 90 CAPSULE | Refills: 0 | Status: SHIPPED | OUTPATIENT
Start: 2019-10-23

## 2019-10-23 NOTE — TELEPHONE ENCOUNTER
Patient is requesting a 90 day supply for each med while she tries to find a new PCP in [place]  She stated that she did talk to you about this at her last visit  I did update the new pharmacy in the system

## 2020-02-06 NOTE — TELEPHONE ENCOUNTER
02/06/20 9:24 AM     Thank you for your request  Your request has been received, reviewed, and the patient chart updated  The PCP has successfully been removed with a patient attribution note  This message will now be completed  Thank you  Sylvia Kaiser    For future: please do not remove PCP and patient moving, per new workflow, try to update Epic chart with new address as proof

## 2020-02-19 DIAGNOSIS — E78.2 MIXED HYPERLIPIDEMIA: ICD-10-CM

## 2020-02-19 RX ORDER — ATORVASTATIN CALCIUM 10 MG/1
TABLET, FILM COATED ORAL
Qty: 90 TABLET | Refills: 0 | Status: SHIPPED | OUTPATIENT
Start: 2020-02-19

## 2020-04-06 DIAGNOSIS — K21.9 GERD WITHOUT ESOPHAGITIS: ICD-10-CM

## 2020-04-06 DIAGNOSIS — E11.9 TYPE 2 DIABETES MELLITUS WITHOUT COMPLICATION, WITHOUT LONG-TERM CURRENT USE OF INSULIN (HCC): ICD-10-CM

## 2020-04-06 RX ORDER — SITAGLIPTIN AND METFORMIN HYDROCHLORIDE 500; 50 MG/1; MG/1
TABLET, FILM COATED ORAL
Qty: 90 TABLET | Refills: 1 | OUTPATIENT
Start: 2020-04-06

## 2020-04-06 RX ORDER — OMEPRAZOLE 20 MG/1
CAPSULE, DELAYED RELEASE ORAL
Qty: 90 CAPSULE | Refills: 0 | OUTPATIENT
Start: 2020-04-06